# Patient Record
Sex: FEMALE | Race: WHITE | NOT HISPANIC OR LATINO
[De-identification: names, ages, dates, MRNs, and addresses within clinical notes are randomized per-mention and may not be internally consistent; named-entity substitution may affect disease eponyms.]

---

## 2020-11-07 ENCOUNTER — TRANSCRIPTION ENCOUNTER (OUTPATIENT)
Age: 75
End: 2020-11-07

## 2021-08-25 ENCOUNTER — OUTPATIENT (OUTPATIENT)
Dept: OUTPATIENT SERVICES | Facility: HOSPITAL | Age: 76
LOS: 1 days | Discharge: HOME | End: 2021-08-25
Payer: MEDICARE

## 2021-08-25 VITALS
SYSTOLIC BLOOD PRESSURE: 125 MMHG | HEART RATE: 88 BPM | OXYGEN SATURATION: 95 % | TEMPERATURE: 99 F | RESPIRATION RATE: 16 BRPM | HEIGHT: 64 IN | WEIGHT: 155.21 LBS | DIASTOLIC BLOOD PRESSURE: 61 MMHG

## 2021-08-25 DIAGNOSIS — Z90.89 ACQUIRED ABSENCE OF OTHER ORGANS: Chronic | ICD-10-CM

## 2021-08-25 DIAGNOSIS — Z90.710 ACQUIRED ABSENCE OF BOTH CERVIX AND UTERUS: Chronic | ICD-10-CM

## 2021-08-25 DIAGNOSIS — Z01.818 ENCOUNTER FOR OTHER PREPROCEDURAL EXAMINATION: ICD-10-CM

## 2021-08-25 DIAGNOSIS — Z98.890 OTHER SPECIFIED POSTPROCEDURAL STATES: Chronic | ICD-10-CM

## 2021-08-25 DIAGNOSIS — Z90.49 ACQUIRED ABSENCE OF OTHER SPECIFIED PARTS OF DIGESTIVE TRACT: Chronic | ICD-10-CM

## 2021-08-25 LAB
ANION GAP SERPL CALC-SCNC: 7 MMOL/L — SIGNIFICANT CHANGE UP (ref 5–17)
APTT BLD: 30.4 SEC — SIGNIFICANT CHANGE UP (ref 27.5–35.5)
BLD GP AB SCN SERPL QL: SIGNIFICANT CHANGE UP
BUN SERPL-MCNC: 16 MG/DL — SIGNIFICANT CHANGE UP (ref 7–23)
CALCIUM SERPL-MCNC: 9.1 MG/DL — SIGNIFICANT CHANGE UP (ref 8.5–10.1)
CHLORIDE SERPL-SCNC: 102 MMOL/L — SIGNIFICANT CHANGE UP (ref 96–108)
CO2 SERPL-SCNC: 30 MMOL/L — SIGNIFICANT CHANGE UP (ref 22–31)
CREAT SERPL-MCNC: 0.62 MG/DL — SIGNIFICANT CHANGE UP (ref 0.5–1.3)
GLUCOSE SERPL-MCNC: 94 MG/DL — SIGNIFICANT CHANGE UP (ref 70–99)
HCT VFR BLD CALC: 40.6 % — SIGNIFICANT CHANGE UP (ref 34.5–45)
HGB BLD-MCNC: 13.5 G/DL — SIGNIFICANT CHANGE UP (ref 11.5–15.5)
INR BLD: 0.96 RATIO — SIGNIFICANT CHANGE UP (ref 0.88–1.16)
MCHC RBC-ENTMCNC: 30.8 PG — SIGNIFICANT CHANGE UP (ref 27–34)
MCHC RBC-ENTMCNC: 33.3 GM/DL — SIGNIFICANT CHANGE UP (ref 32–36)
MCV RBC AUTO: 92.7 FL — SIGNIFICANT CHANGE UP (ref 80–100)
NRBC # BLD: 0 /100 WBCS — SIGNIFICANT CHANGE UP (ref 0–0)
PLATELET # BLD AUTO: 217 K/UL — SIGNIFICANT CHANGE UP (ref 150–400)
POTASSIUM SERPL-MCNC: 4.2 MMOL/L — SIGNIFICANT CHANGE UP (ref 3.5–5.3)
POTASSIUM SERPL-SCNC: 4.2 MMOL/L — SIGNIFICANT CHANGE UP (ref 3.5–5.3)
PROTHROM AB SERPL-ACNC: 11.2 SEC — SIGNIFICANT CHANGE UP (ref 10.6–13.6)
RBC # BLD: 4.38 M/UL — SIGNIFICANT CHANGE UP (ref 3.8–5.2)
RBC # FLD: 12.2 % — SIGNIFICANT CHANGE UP (ref 10.3–14.5)
SODIUM SERPL-SCNC: 139 MMOL/L — SIGNIFICANT CHANGE UP (ref 135–145)
WBC # BLD: 6.36 K/UL — SIGNIFICANT CHANGE UP (ref 3.8–10.5)
WBC # FLD AUTO: 6.36 K/UL — SIGNIFICANT CHANGE UP (ref 3.8–10.5)

## 2021-08-25 PROCEDURE — 93010 ELECTROCARDIOGRAM REPORT: CPT

## 2021-08-25 RX ORDER — LOSARTAN POTASSIUM 100 MG/1
1 TABLET, FILM COATED ORAL
Qty: 0 | Refills: 0 | DISCHARGE

## 2021-08-25 RX ORDER — MAGNESIUM OXIDE 400 MG ORAL TABLET 241.3 MG
1 TABLET ORAL
Qty: 0 | Refills: 0 | DISCHARGE

## 2021-08-25 RX ORDER — MIRABEGRON 50 MG/1
1 TABLET, EXTENDED RELEASE ORAL
Qty: 0 | Refills: 0 | DISCHARGE

## 2021-08-25 RX ORDER — OMEPRAZOLE 10 MG/1
1 CAPSULE, DELAYED RELEASE ORAL
Qty: 0 | Refills: 0 | DISCHARGE

## 2021-08-25 RX ORDER — GABAPENTIN 400 MG/1
1 CAPSULE ORAL
Qty: 0 | Refills: 0 | DISCHARGE

## 2021-08-25 RX ORDER — METOPROLOL TARTRATE 50 MG
1 TABLET ORAL
Qty: 0 | Refills: 0 | DISCHARGE

## 2021-08-25 RX ORDER — METHENAMINE MANDELATE 1 G
0 TABLET ORAL
Qty: 0 | Refills: 0 | DISCHARGE

## 2021-08-25 RX ORDER — MULTIVIT-MIN/FERROUS GLUCONATE 9 MG/15 ML
1 LIQUID (ML) ORAL
Qty: 0 | Refills: 0 | DISCHARGE

## 2021-08-25 RX ORDER — MILK THISTLE 150 MG
1 CAPSULE ORAL
Qty: 0 | Refills: 0 | DISCHARGE

## 2021-08-25 RX ORDER — ALPRAZOLAM 0.25 MG
0.25 TABLET ORAL
Qty: 0 | Refills: 0 | DISCHARGE

## 2021-08-25 RX ORDER — ATORVASTATIN CALCIUM 80 MG/1
1 TABLET, FILM COATED ORAL
Qty: 0 | Refills: 0 | DISCHARGE

## 2021-08-25 RX ORDER — PSEUDOEPHED/ACETAMINOP/DPHA/CP 30-500-25
2 TABLET ORAL
Qty: 0 | Refills: 0 | DISCHARGE

## 2021-08-25 RX ORDER — ZINC SULFATE TAB 220 MG (50 MG ZINC EQUIVALENT) 220 (50 ZN) MG
25 TAB ORAL
Qty: 0 | Refills: 0 | DISCHARGE

## 2021-08-25 NOTE — PHYSICAL THERAPY INITIAL EVALUATION ADULT - RANGE OF MOTION EXAMINATION, REHAB EVAL
except right hip limited due to pain./bilateral upper extremity ROM was WNL (within normal limits)/bilateral lower extremity was ROM was WNL (within normal limits)/deficits as listed below

## 2021-08-25 NOTE — H&P PST ADULT - HISTORY OF PRESENT ILLNESS
76 year old female with a past medical history of HTN, GERD, HLD, chronic UTI's, pain that radiates down right leg, and limited ROM to right hip secondary to osteoarthritis  She is scheduled for a right total hip arthroplasty on 9/13/2021.    She denies fever, cough, SOB, recent travels, and sick contacts.     Goal: To walk without pain

## 2021-08-25 NOTE — H&P PST ADULT - NSICDXPASTSURGICALHX_GEN_ALL_CORE_FT
PAST SURGICAL HISTORY:  H/O arthroscopy of shoulder     H/O inguinal hernia repair     H/O: hysterectomy 200      History of appendectomy teenager    History of bladder surgery     History of tonsillectomy

## 2021-08-25 NOTE — PHYSICAL THERAPY INITIAL EVALUATION ADULT - PERTINENT HX OF CURRENT PROBLEM, REHAB EVAL
Patient attends pre-op testing today following consult c Dr. Moura due to chronic pain to right hip. Elective R EMMA is now scheduled in this facility for 9/13/2021.

## 2021-08-25 NOTE — OCCUPATIONAL THERAPY INITIAL EVALUATION ADULT - ADDITIONAL COMMENTS
Pt lives alone (Daughter can assist post op) in a private house with 4 steps with bilateral handrails (Close together) to enter. Once inside, the pt main bedroom and bathroom is on that floor when entering. The pts bathroom has a walk in shower stall, comfort height toilet seat and grab bars +. The pt reports that a 3/1 commode can fit over the toilet at home. The pt ambulates with no device and owns a straight cane. The pt reports that she has a bed cane to assist with getting into and out of the bed. The pt daily pain is a 0/10 at rest and a 10/10 with movement. The pt manages the pain with rest and Motrin, gabapentin and celebrex. The pt recently had outpatient PT, no recent falls and no buckling of the knees reported. The pt wears glasses all the time, R handed, currently drives and has no hearing impairments.

## 2021-08-25 NOTE — H&P PST ADULT - ASSESSMENT
76 year old female with a past medical history of HTN, GERD, HLD, chronic UTIs pain and limited ROM to right hip secondary to osteoarthritis  She is scheduled for a right total hip arthroplasty on 2021.    CAPRINI SCORE [CLOT]    AGE RELATED RISK FACTORS                                                       MOBILITY RELATED FACTORS  [ ] Age 41-60 years                                            (1 Point)                  [ ] Bed rest                                                        (1 Point)  [x ] Age: 61-74 years                                           (2 Points)                 [ ] Plaster cast                                                   (2 Points)  [ ] Age= 75 years                                              (3 Points)                 [ ] Bed bound for more than 72 hours                 (2 Points)    DISEASE RELATED RISK FACTORS                                               GENDER SPECIFIC FACTORS  [ ] Edema in the lower extremities                       (1 Point)                  [ ] Pregnancy                                                     (1 Point)  [ ] Varicose veins                                               (1 Point)                  [ ] Post-partum < 6 weeks                                   (1 Point)             [x ] BMI > 25 Kg/m2                                            (1 Point)                  [ ] Hormonal therapy  or oral contraception          (1 Point)                 [ ] Sepsis (in the previous month)                        (1 Point)                  [ ] History of pregnancy complications                 (1 point)  [ ] Pneumonia or serious lung disease                                               [ ] Unexplained or recurrent                     (1 Point)           (in the previous month)                               (1 Point)  [ ] Abnormal pulmonary function test                     (1 Point)                 SURGERY RELATED RISK FACTORS  [ ] Acute myocardial infarction                              (1 Point)                 [ ]  Section                                             (1 Point)  [ ] Congestive heart failure (in the previous month)  (1 Point)               [ ] Minor surgery                                                  (1 Point)   [ ] Inflammatory bowel disease                             (1 Point)                 [ ] Arthroscopic surgery                                        (2 Points)  [ ] Central venous access                                      (2 Points)                [ ] General surgery lasting more than 45 minutes   (2 Points)       [ ] Stroke (in the previous month)                          (5 Points)               [ x] Elective arthroplasty                                         (5 Points)                                                                                                                                               HEMATOLOGY RELATED FACTORS                                                 TRAUMA RELATED RISK FACTORS  [ ] Prior episodes of VTE                                     (3 Points)                [ ] Fracture of the hip, pelvis, or leg                       (5 Points)  [ ] Positive family history for VTE                         (3 Points)                 [ ] Acute spinal cord injury (in the previous month)  (5 Points)  [ ] Prothrombin 08304 A                                     (3 Points)                 [ ] Paralysis  (less than 1 month)                             (5 Points)  [ ] Factor V Leiden                                             (3 Points)                  [ ] Multiple Trauma within 1 month                        (5 Points)  [ ] Lupus anticoagulants                                     (3 Points)                                                           [ ] Anticardiolipin antibodies                               (3 Points)                                                       [ ] High homocysteine in the blood                      (3 Points)                                             [ ] Other congenital or acquired thrombophilia      (3 Points)                                                [ ] Heparin induced thrombocytopenia                  (3 Points)                                          Total Score [      8    ]    Caprini Score 0 - 2:  Low Risk, No VTE Prophylaxis required for most patients, encourage ambulation  Caprini Score 3 - 6:  At Risk, pharmacologic VTE prophylaxis is indicated for most patients (in the absence of a contraindication)  Caprini Score Greater than or = 7:  High Risk, pharmacologic VTE prophylaxis is indicated for most patients (in the absence of a contraindication)    Caprini score indicates that the patient is high risk for VTE event ( score 6 or greater). Surgical patient's in this group will benefit from both pharmacologic prophylaxis and intermittent compression devices . Surgical team will determine the balance between VTE  risk and bleeding risk and other clinical considerations

## 2021-08-25 NOTE — PHYSICAL THERAPY INITIAL EVALUATION ADULT - ADDITIONAL COMMENTS
Pt lives with her daughter (whom can provide assist upon D/C home) in a private home, 4 entry steps (B/L rails, close enough to reach both at the same time), all amenities on the 1st floor. Pt has a walk-in shower stall with a retractable shower head, comfort toilet seat height, & + grab bar. Pt states she is currently independent with all functional mobility including community ambulation without device. Pt owns straight cane (in good working condition & easily accessible). Pt states she is independent with ADL's as well. Pt also noted having bed cane(rail) that she use at home. Pt reports daily 0/10 pain at rest & states it is worse with any activity 10/10. Pt is right hand dominant, wears eye glasses, drives, & works part time remotely. Pt stated taking motrin prn for pain management. Goal of therapy: manage pain & improve functional mobility.

## 2021-08-25 NOTE — OCCUPATIONAL THERAPY INITIAL EVALUATION ADULT - PERTINENT HX OF CURRENT PROBLEM, REHAB EVAL
R hip OA which impacts pts ability to perform functional tasks/transfers and mobility. Pt is scheduled for R THR on 9/13/21.

## 2021-08-25 NOTE — H&P PST ADULT - NS PRO FEM  PAP SMEARS 3YRS
Pharmacy notified.   America Gold RN on 2/11/2021 at 8:40 AM         Somatropin (NORDITROPIN FLEXPRO) 5 MG/1.5ML SOPN 4.5 mL 5 2/2/2021  --   Sig - Route: Inject 0.5 mg Subcutaneous daily - Subcutaneous   Sent to pharmacy as: Norditropin FlexPro 5 MG/1.5ML Subcutaneous Solution Pen-injector (Somatropin)   Class: E-Prescribe   Order: [id number]   E-Prescribing Status: Receipt confirmed by pharmacy (2/2/2021  2:12 PM CST)   Printout Tracking    External Result Report   Pharmacy    [facility] - [place], [place] - [address]        Medication: Norditropin-Approved  Insurance Company: [employer] ([facility]) - Phone [phone] Fax [phone]  Pharmacy Filling the Rx: [facility] - [place] [address]  Filling Pharmacy Phone: [phone]  Filling Pharmacy Fax: [phone]  Start Date: 2/5/2021          
yes

## 2021-08-25 NOTE — H&P PST ADULT - NSICDXPASTMEDICALHX_GEN_ALL_CORE_FT
PAST MEDICAL HISTORY:  Chronic UTI     GERD (gastroesophageal reflux disease)     HLD (hyperlipidemia)     HTN (hypertension)     OAB (overactive bladder)

## 2021-08-25 NOTE — PHYSICAL THERAPY INITIAL EVALUATION ADULT - MODIFIED CLINICAL TEST OF SENSORY INTEGRATION IN BALANCE TEST
30 second Sit to Stand Test: (reps: 11, adaptation: uses armrest) ; One Leg Stand Test (OLST): Right: 2 secs,  Left: 2 secs

## 2021-08-26 LAB
A1C WITH ESTIMATED AVERAGE GLUCOSE RESULT: 6 % — HIGH (ref 4–5.6)
ESTIMATED AVERAGE GLUCOSE: 126 MG/DL — HIGH (ref 68–114)
MRSA PCR RESULT.: SIGNIFICANT CHANGE UP
S AUREUS DNA NOSE QL NAA+PROBE: SIGNIFICANT CHANGE UP

## 2021-08-27 PROBLEM — K21.9 GASTRO-ESOPHAGEAL REFLUX DISEASE WITHOUT ESOPHAGITIS: Chronic | Status: ACTIVE | Noted: 2021-08-25

## 2021-08-27 PROBLEM — I10 ESSENTIAL (PRIMARY) HYPERTENSION: Chronic | Status: ACTIVE | Noted: 2021-08-25

## 2021-08-27 PROBLEM — E78.5 HYPERLIPIDEMIA, UNSPECIFIED: Chronic | Status: ACTIVE | Noted: 2021-08-25

## 2021-08-27 PROBLEM — N39.0 URINARY TRACT INFECTION, SITE NOT SPECIFIED: Chronic | Status: ACTIVE | Noted: 2021-08-25

## 2021-08-27 PROBLEM — N32.81 OVERACTIVE BLADDER: Chronic | Status: ACTIVE | Noted: 2021-08-25

## 2021-09-03 DIAGNOSIS — Z01.818 ENCOUNTER FOR OTHER PREPROCEDURAL EXAMINATION: ICD-10-CM

## 2021-09-03 PROBLEM — Z00.00 ENCOUNTER FOR PREVENTIVE HEALTH EXAMINATION: Status: ACTIVE | Noted: 2021-09-03

## 2021-09-10 ENCOUNTER — APPOINTMENT (OUTPATIENT)
Dept: DISASTER EMERGENCY | Facility: CLINIC | Age: 76
End: 2021-09-10

## 2021-09-11 LAB — SARS-COV-2 N GENE NPH QL NAA+PROBE: NOT DETECTED

## 2021-09-12 ENCOUNTER — TRANSCRIPTION ENCOUNTER (OUTPATIENT)
Age: 76
End: 2021-09-12

## 2021-09-13 ENCOUNTER — TRANSCRIPTION ENCOUNTER (OUTPATIENT)
Age: 76
End: 2021-09-13

## 2021-09-13 ENCOUNTER — RESULT REVIEW (OUTPATIENT)
Age: 76
End: 2021-09-13

## 2021-09-13 ENCOUNTER — INPATIENT (INPATIENT)
Facility: HOSPITAL | Age: 76
LOS: 0 days | Discharge: HOME HEALTH | End: 2021-09-14
Attending: ORTHOPAEDIC SURGERY | Admitting: ORTHOPAEDIC SURGERY
Payer: MEDICARE

## 2021-09-13 ENCOUNTER — OUTPATIENT (OUTPATIENT)
Dept: OUTPATIENT SERVICES | Facility: HOSPITAL | Age: 76
LOS: 1 days | Discharge: HOME HEALTH | End: 2021-09-13
Payer: MEDICARE

## 2021-09-13 VITALS
WEIGHT: 153 LBS | RESPIRATION RATE: 17 BRPM | OXYGEN SATURATION: 98 % | DIASTOLIC BLOOD PRESSURE: 70 MMHG | HEIGHT: 64 IN | HEART RATE: 69 BPM | SYSTOLIC BLOOD PRESSURE: 110 MMHG | TEMPERATURE: 98 F

## 2021-09-13 DIAGNOSIS — Z90.710 ACQUIRED ABSENCE OF BOTH CERVIX AND UTERUS: Chronic | ICD-10-CM

## 2021-09-13 DIAGNOSIS — Z98.890 OTHER SPECIFIED POSTPROCEDURAL STATES: Chronic | ICD-10-CM

## 2021-09-13 DIAGNOSIS — Z90.89 ACQUIRED ABSENCE OF OTHER ORGANS: Chronic | ICD-10-CM

## 2021-09-13 DIAGNOSIS — Z90.49 ACQUIRED ABSENCE OF OTHER SPECIFIED PARTS OF DIGESTIVE TRACT: Chronic | ICD-10-CM

## 2021-09-13 LAB
ANION GAP SERPL CALC-SCNC: 2 MMOL/L — LOW (ref 5–17)
BLD GP AB SCN SERPL QL: SIGNIFICANT CHANGE UP
BUN SERPL-MCNC: 13 MG/DL — SIGNIFICANT CHANGE UP (ref 7–23)
CALCIUM SERPL-MCNC: 8.6 MG/DL — SIGNIFICANT CHANGE UP (ref 8.5–10.1)
CHLORIDE SERPL-SCNC: 105 MMOL/L — SIGNIFICANT CHANGE UP (ref 96–108)
CO2 SERPL-SCNC: 31 MMOL/L — SIGNIFICANT CHANGE UP (ref 22–31)
CREAT SERPL-MCNC: 0.6 MG/DL — SIGNIFICANT CHANGE UP (ref 0.5–1.3)
GLUCOSE BLDC GLUCOMTR-MCNC: 91 MG/DL — SIGNIFICANT CHANGE UP (ref 70–99)
GLUCOSE SERPL-MCNC: 113 MG/DL — HIGH (ref 70–99)
HCT VFR BLD CALC: 38.8 % — SIGNIFICANT CHANGE UP (ref 34.5–45)
HGB BLD-MCNC: 12.5 G/DL — SIGNIFICANT CHANGE UP (ref 11.5–15.5)
MCHC RBC-ENTMCNC: 30.3 PG — SIGNIFICANT CHANGE UP (ref 27–34)
MCHC RBC-ENTMCNC: 32.2 GM/DL — SIGNIFICANT CHANGE UP (ref 32–36)
MCV RBC AUTO: 94.2 FL — SIGNIFICANT CHANGE UP (ref 80–100)
NRBC # BLD: 0 /100 WBCS — SIGNIFICANT CHANGE UP (ref 0–0)
PLATELET # BLD AUTO: 193 K/UL — SIGNIFICANT CHANGE UP (ref 150–400)
POTASSIUM SERPL-MCNC: 4.2 MMOL/L — SIGNIFICANT CHANGE UP (ref 3.5–5.3)
POTASSIUM SERPL-SCNC: 4.2 MMOL/L — SIGNIFICANT CHANGE UP (ref 3.5–5.3)
RBC # BLD: 4.12 M/UL — SIGNIFICANT CHANGE UP (ref 3.8–5.2)
RBC # FLD: 12.3 % — SIGNIFICANT CHANGE UP (ref 10.3–14.5)
SODIUM SERPL-SCNC: 138 MMOL/L — SIGNIFICANT CHANGE UP (ref 135–145)
WBC # BLD: 7.69 K/UL — SIGNIFICANT CHANGE UP (ref 3.8–10.5)
WBC # FLD AUTO: 7.69 K/UL — SIGNIFICANT CHANGE UP (ref 3.8–10.5)

## 2021-09-13 PROCEDURE — 88311 DECALCIFY TISSUE: CPT | Mod: 26

## 2021-09-13 PROCEDURE — 88305 TISSUE EXAM BY PATHOLOGIST: CPT | Mod: 26

## 2021-09-13 RX ORDER — TRAMADOL HYDROCHLORIDE 50 MG/1
100 TABLET ORAL EVERY 4 HOURS
Refills: 0 | Status: DISCONTINUED | OUTPATIENT
Start: 2021-09-13 | End: 2021-09-14

## 2021-09-13 RX ORDER — INFLUENZA VIRUS VACCINE 15; 15; 15; 15 UG/.5ML; UG/.5ML; UG/.5ML; UG/.5ML
0.5 SUSPENSION INTRAMUSCULAR ONCE
Refills: 0 | Status: DISCONTINUED | OUTPATIENT
Start: 2021-09-13 | End: 2021-09-14

## 2021-09-13 RX ORDER — CELECOXIB 200 MG/1
200 CAPSULE ORAL ONCE
Refills: 0 | Status: COMPLETED | OUTPATIENT
Start: 2021-09-13 | End: 2021-09-13

## 2021-09-13 RX ORDER — LANOLIN ALCOHOL/MO/W.PET/CERES
3 CREAM (GRAM) TOPICAL AT BEDTIME
Refills: 0 | Status: DISCONTINUED | OUTPATIENT
Start: 2021-09-13 | End: 2021-09-14

## 2021-09-13 RX ORDER — MAGNESIUM HYDROXIDE 400 MG/1
30 TABLET, CHEWABLE ORAL DAILY
Refills: 0 | Status: DISCONTINUED | OUTPATIENT
Start: 2021-09-13 | End: 2021-09-14

## 2021-09-13 RX ORDER — ACETAMINOPHEN 500 MG
975 TABLET ORAL EVERY 8 HOURS
Refills: 0 | Status: DISCONTINUED | OUTPATIENT
Start: 2021-09-13 | End: 2021-09-14

## 2021-09-13 RX ORDER — HYDROMORPHONE HYDROCHLORIDE 2 MG/ML
0.5 INJECTION INTRAMUSCULAR; INTRAVENOUS; SUBCUTANEOUS ONCE
Refills: 0 | Status: DISCONTINUED | OUTPATIENT
Start: 2021-09-13 | End: 2021-09-14

## 2021-09-13 RX ORDER — KETOROLAC TROMETHAMINE 30 MG/ML
15 SYRINGE (ML) INJECTION ONCE
Refills: 0 | Status: DISCONTINUED | OUTPATIENT
Start: 2021-09-13 | End: 2021-09-14

## 2021-09-13 RX ORDER — ACETAMINOPHEN 500 MG
650 TABLET ORAL ONCE
Refills: 0 | Status: COMPLETED | OUTPATIENT
Start: 2021-09-13 | End: 2021-09-13

## 2021-09-13 RX ORDER — BENZOCAINE AND MENTHOL 5; 1 G/100ML; G/100ML
1 LIQUID ORAL EVERY 4 HOURS
Refills: 0 | Status: DISCONTINUED | OUTPATIENT
Start: 2021-09-13 | End: 2021-09-14

## 2021-09-13 RX ORDER — ATORVASTATIN CALCIUM 80 MG/1
10 TABLET, FILM COATED ORAL AT BEDTIME
Refills: 0 | Status: DISCONTINUED | OUTPATIENT
Start: 2021-09-13 | End: 2021-09-13

## 2021-09-13 RX ORDER — OXYCODONE HYDROCHLORIDE 5 MG/1
10 TABLET ORAL
Refills: 0 | Status: DISCONTINUED | OUTPATIENT
Start: 2021-09-13 | End: 2021-09-13

## 2021-09-13 RX ORDER — SENNA PLUS 8.6 MG/1
2 TABLET ORAL AT BEDTIME
Refills: 0 | Status: DISCONTINUED | OUTPATIENT
Start: 2021-09-13 | End: 2021-09-14

## 2021-09-13 RX ORDER — ASCORBIC ACID 60 MG
500 TABLET,CHEWABLE ORAL
Refills: 0 | Status: DISCONTINUED | OUTPATIENT
Start: 2021-09-13 | End: 2021-09-14

## 2021-09-13 RX ORDER — SODIUM CHLORIDE 9 MG/ML
1000 INJECTION, SOLUTION INTRAVENOUS
Refills: 0 | Status: DISCONTINUED | OUTPATIENT
Start: 2021-09-13 | End: 2021-09-13

## 2021-09-13 RX ORDER — SODIUM CHLORIDE 9 MG/ML
3 INJECTION INTRAMUSCULAR; INTRAVENOUS; SUBCUTANEOUS EVERY 8 HOURS
Refills: 0 | Status: DISCONTINUED | OUTPATIENT
Start: 2021-09-13 | End: 2021-09-13

## 2021-09-13 RX ORDER — ATORVASTATIN CALCIUM 80 MG/1
10 TABLET, FILM COATED ORAL DAILY
Refills: 0 | Status: DISCONTINUED | OUTPATIENT
Start: 2021-09-13 | End: 2021-09-14

## 2021-09-13 RX ORDER — LOSARTAN POTASSIUM 100 MG/1
50 TABLET, FILM COATED ORAL DAILY
Refills: 0 | Status: DISCONTINUED | OUTPATIENT
Start: 2021-09-13 | End: 2021-09-14

## 2021-09-13 RX ORDER — DEXAMETHASONE 0.5 MG/5ML
10 ELIXIR ORAL ONCE
Refills: 0 | Status: COMPLETED | OUTPATIENT
Start: 2021-09-14 | End: 2021-09-14

## 2021-09-13 RX ORDER — TRAMADOL HYDROCHLORIDE 50 MG/1
50 TABLET ORAL ONCE
Refills: 0 | Status: DISCONTINUED | OUTPATIENT
Start: 2021-09-13 | End: 2021-09-13

## 2021-09-13 RX ORDER — TRAMADOL HYDROCHLORIDE 50 MG/1
50 TABLET ORAL EVERY 4 HOURS
Refills: 0 | Status: DISCONTINUED | OUTPATIENT
Start: 2021-09-13 | End: 2021-09-14

## 2021-09-13 RX ORDER — ALPRAZOLAM 0.25 MG
0.25 TABLET ORAL
Refills: 0 | Status: DISCONTINUED | OUTPATIENT
Start: 2021-09-13 | End: 2021-09-14

## 2021-09-13 RX ORDER — CEFAZOLIN SODIUM 1 G
2000 VIAL (EA) INJECTION EVERY 8 HOURS
Refills: 0 | Status: COMPLETED | OUTPATIENT
Start: 2021-09-13 | End: 2021-09-13

## 2021-09-13 RX ORDER — SODIUM CHLORIDE 9 MG/ML
1000 INJECTION, SOLUTION INTRAVENOUS
Refills: 0 | Status: DISCONTINUED | OUTPATIENT
Start: 2021-09-13 | End: 2021-09-14

## 2021-09-13 RX ORDER — GABAPENTIN 400 MG/1
600 CAPSULE ORAL
Refills: 0 | Status: DISCONTINUED | OUTPATIENT
Start: 2021-09-13 | End: 2021-09-14

## 2021-09-13 RX ORDER — ACETAMINOPHEN 500 MG
1000 TABLET ORAL ONCE
Refills: 0 | Status: COMPLETED | OUTPATIENT
Start: 2021-09-13 | End: 2021-09-13

## 2021-09-13 RX ORDER — HYDROMORPHONE HYDROCHLORIDE 2 MG/ML
0.4 INJECTION INTRAMUSCULAR; INTRAVENOUS; SUBCUTANEOUS
Refills: 0 | Status: DISCONTINUED | OUTPATIENT
Start: 2021-09-13 | End: 2021-09-13

## 2021-09-13 RX ORDER — OXYCODONE HYDROCHLORIDE 5 MG/1
5 TABLET ORAL
Refills: 0 | Status: DISCONTINUED | OUTPATIENT
Start: 2021-09-13 | End: 2021-09-13

## 2021-09-13 RX ORDER — METOPROLOL TARTRATE 50 MG
50 TABLET ORAL DAILY
Refills: 0 | Status: DISCONTINUED | OUTPATIENT
Start: 2021-09-13 | End: 2021-09-14

## 2021-09-13 RX ORDER — ACETAMINOPHEN 500 MG
1000 TABLET ORAL ONCE
Refills: 0 | Status: DISCONTINUED | OUTPATIENT
Start: 2021-09-13 | End: 2021-09-14

## 2021-09-13 RX ORDER — ONDANSETRON 8 MG/1
4 TABLET, FILM COATED ORAL EVERY 6 HOURS
Refills: 0 | Status: DISCONTINUED | OUTPATIENT
Start: 2021-09-13 | End: 2021-09-14

## 2021-09-13 RX ORDER — ONDANSETRON 8 MG/1
4 TABLET, FILM COATED ORAL ONCE
Refills: 0 | Status: DISCONTINUED | OUTPATIENT
Start: 2021-09-13 | End: 2021-09-13

## 2021-09-13 RX ORDER — POLYETHYLENE GLYCOL 3350 17 G/17G
17 POWDER, FOR SOLUTION ORAL AT BEDTIME
Refills: 0 | Status: DISCONTINUED | OUTPATIENT
Start: 2021-09-13 | End: 2021-09-14

## 2021-09-13 RX ORDER — ASPIRIN/CALCIUM CARB/MAGNESIUM 324 MG
81 TABLET ORAL
Refills: 0 | Status: DISCONTINUED | OUTPATIENT
Start: 2021-09-14 | End: 2021-09-14

## 2021-09-13 RX ORDER — CELECOXIB 200 MG/1
200 CAPSULE ORAL EVERY 12 HOURS
Refills: 0 | Status: DISCONTINUED | OUTPATIENT
Start: 2021-09-14 | End: 2021-09-14

## 2021-09-13 RX ORDER — TRAMADOL HYDROCHLORIDE 50 MG/1
50 TABLET ORAL ONCE
Refills: 0 | Status: DISCONTINUED | OUTPATIENT
Start: 2021-09-13 | End: 2021-09-14

## 2021-09-13 RX ORDER — PANTOPRAZOLE SODIUM 20 MG/1
40 TABLET, DELAYED RELEASE ORAL
Refills: 0 | Status: DISCONTINUED | OUTPATIENT
Start: 2021-09-13 | End: 2021-09-14

## 2021-09-13 RX ADMIN — Medication 100 MILLIGRAM(S): at 23:24

## 2021-09-13 RX ADMIN — GABAPENTIN 600 MILLIGRAM(S): 400 CAPSULE ORAL at 18:36

## 2021-09-13 RX ADMIN — OXYCODONE HYDROCHLORIDE 10 MILLIGRAM(S): 5 TABLET ORAL at 15:54

## 2021-09-13 RX ADMIN — POLYETHYLENE GLYCOL 3350 17 GRAM(S): 17 POWDER, FOR SOLUTION ORAL at 21:10

## 2021-09-13 RX ADMIN — OXYCODONE HYDROCHLORIDE 10 MILLIGRAM(S): 5 TABLET ORAL at 12:50

## 2021-09-13 RX ADMIN — SENNA PLUS 2 TABLET(S): 8.6 TABLET ORAL at 21:09

## 2021-09-13 RX ADMIN — Medication 1000 MILLIGRAM(S): at 15:30

## 2021-09-13 RX ADMIN — TRAMADOL HYDROCHLORIDE 50 MILLIGRAM(S): 50 TABLET ORAL at 19:30

## 2021-09-13 RX ADMIN — GABAPENTIN 600 MILLIGRAM(S): 400 CAPSULE ORAL at 23:24

## 2021-09-13 RX ADMIN — SODIUM CHLORIDE 3 MILLILITER(S): 9 INJECTION INTRAMUSCULAR; INTRAVENOUS; SUBCUTANEOUS at 06:43

## 2021-09-13 RX ADMIN — TRAMADOL HYDROCHLORIDE 100 MILLIGRAM(S): 50 TABLET ORAL at 21:10

## 2021-09-13 RX ADMIN — CELECOXIB 200 MILLIGRAM(S): 200 CAPSULE ORAL at 06:50

## 2021-09-13 RX ADMIN — Medication 650 MILLIGRAM(S): at 06:50

## 2021-09-13 RX ADMIN — Medication 100 MILLIGRAM(S): at 15:54

## 2021-09-13 RX ADMIN — GABAPENTIN 600 MILLIGRAM(S): 400 CAPSULE ORAL at 13:48

## 2021-09-13 RX ADMIN — SODIUM CHLORIDE 125 MILLILITER(S): 9 INJECTION, SOLUTION INTRAVENOUS at 11:56

## 2021-09-13 RX ADMIN — Medication 3 MILLIGRAM(S): at 21:09

## 2021-09-13 RX ADMIN — TRAMADOL HYDROCHLORIDE 100 MILLIGRAM(S): 50 TABLET ORAL at 22:10

## 2021-09-13 RX ADMIN — Medication 500 MILLIGRAM(S): at 18:36

## 2021-09-13 RX ADMIN — Medication 400 MILLIGRAM(S): at 14:59

## 2021-09-13 RX ADMIN — OXYCODONE HYDROCHLORIDE 10 MILLIGRAM(S): 5 TABLET ORAL at 16:54

## 2021-09-13 RX ADMIN — OXYCODONE HYDROCHLORIDE 10 MILLIGRAM(S): 5 TABLET ORAL at 11:56

## 2021-09-13 RX ADMIN — Medication 975 MILLIGRAM(S): at 23:24

## 2021-09-13 RX ADMIN — TRAMADOL HYDROCHLORIDE 50 MILLIGRAM(S): 50 TABLET ORAL at 18:37

## 2021-09-13 RX ADMIN — Medication 0.25 MILLIGRAM(S): at 23:24

## 2021-09-13 RX ADMIN — Medication 1 TABLET(S): at 11:56

## 2021-09-13 NOTE — PHYSICAL THERAPY INITIAL EVALUATION ADULT - GENERAL OBSERVATIONS, REHAB EVAL
Pt found semi supine in bed in NAD, +hep lock, +SCDs, +abduction pillow, agreeable to PT Lian and RN Mendez aware.

## 2021-09-13 NOTE — DISCHARGE NOTE PROVIDER - NSDCFUADDINST_GEN_ALL_CORE_FT
Hip replacement precautions:  Elevate the leg (while keeping hip precautions) as often as possible to help control swelling. Incentive spirometer 10X/hr.     As per Dr. Moura:  Patient does not need abduction pillow at home.  Patient can sleep with regular pillow between their knees.  Patient can sleep up on non operative side.    Keep URSZULA Dressing Clean, Dry and Intact. May shower with URSZULA Dressing. Please do not scrub, soak, peel or pick at the URSZULA dressing. No creams, lotions, or oils over dressing. May shower and let water run over dressing, no baths. Pat dry once out of shower. Dressing to be removed in 7 days. Discard dressing and battery in garbage. If dressing is saturated from border to border - may remove and replace with clean dry dressing.    Shower instructions for URSZULA Dressing: Place battery pack in a water sealed bag (such as a Ziplock bag) and keep battery out of the water.   Alternately you can turn battery pack off (press orange button.) Twist tubing OFF battery pack before entering shower. Once done with showering. Pat dressing dry. Reconnect tubing and twist ON battery pack after you are dry. Then turn battery pack on (press orange button.)

## 2021-09-13 NOTE — DISCHARGE NOTE PROVIDER - NSDCFUADDAPPT_GEN_ALL_CORE_FT
Follow up with your surgeon in two weeks. Call for appointment.  If you need more pain medication, call your surgeon's office. For medication refills or authorizations, please call [phone]  We recommend that you call and schedule a follow up appointment within 2-4 weeks with your primary care physician for repeat blood work (CBC and BMP) for post hospital discharge follow-up care.  Call your surgeon if you have increased redness/pain/drainage or fever. Return to ER for shortness of breath/calf tenderness.

## 2021-09-13 NOTE — DISCHARGE NOTE PROVIDER - CARE PROVIDER_API CALL
Osmar Moura)  Orthopaedic Surgery  [address] [place], [place] [address]  Phone: [phone]  Fax: [phone]  Follow Up Time:

## 2021-09-13 NOTE — DISCHARGE NOTE PROVIDER - NSDCMRMEDTOKEN_GEN_ALL_CORE_FT
Calcium 600+D: 1000  orally  CeleBREX 200 mg oral capsule: 1 cap(s) orally once a day  Centrum Adults oral tablet: 1 tab(s) orally once a day  gabapentin 600 mg oral tablet: 1  orally 4 times a day  Hiprex 1 g oral tablet: orally once a day  Lipitor 10 mg oral tablet: 1 tab(s) orally once a day  losartan 50 mg oral tablet: 1 tab(s) orally once a day  Mag-Ox 400 oral tablet: 1 tab(s) orally once a day  metoprolol succinate 50 mg oral tablet, extended release: 1 tab(s) orally once a day  Motrin 400 mg oral tablet: 1 tab(s) orally every 6 hours  Myrbetriq 25 mg oral tablet, extended release: 1 tab(s) orally once a day  Myrbetriq 50 mg oral tablet, extended release: 1 tab(s) orally once a day  omeprazole 40 mg oral delayed release capsule: 1 cap(s) orally once a day  Turmeric 500 mg oral capsule: 1 cap(s) orally once a day  Tylenol Allergy Complete NightTime oral tablet: 2  orally once a day (at bedtime)  Xanax: 0.25 milligram(s) orally 2 times a day, As Needed  Zinc: 25  orally once a day   ascorbic acid 500 mg oral tablet: 1 tab(s) orally 2 times a day  aspirin 81 mg oral delayed release tablet: 1 tab(s) orally 2 times a day MDD:2 Tabs for DVT Prophylaxis   Calcium 600+D: 1000  orally  Centrum Adults oral tablet: 1 tab(s) orally once a day  gabapentin 600 mg oral tablet: 1  orally 4 times a day  Hiprex 1 g oral tablet: orally once a day  Lipitor 10 mg oral tablet: 1 tab(s) orally once a day  losartan 50 mg oral tablet: 1 tab(s) orally once a day  Mag-Ox 400 oral tablet: 1 tab(s) orally once a day  metoprolol succinate 50 mg oral tablet, extended release: 1 tab(s) orally once a day  Myrbetriq 25 mg oral tablet, extended release: 1 tab(s) orally once a day  Myrbetriq 50 mg oral tablet, extended release: 1 tab(s) orally once a day  omeprazole 40 mg oral delayed release capsule: 1 cap(s) orally once a day  senna oral tablet: 2 tab(s) orally once a day (at bedtime)  traMADol 50 mg oral tablet: 1 or 2  tab(s) orally every 4 hours, As needed MDD:12 Tabs  Turmeric 500 mg oral capsule: 1 cap(s) orally once a day  Tylenol Allergy Complete NightTime oral tablet: 2  orally once a day (at bedtime)  Xanax: 0.25 milligram(s) orally 2 times a day, As Needed  Zinc: 25  orally once a day

## 2021-09-13 NOTE — PHYSICAL THERAPY INITIAL EVALUATION ADULT - PERTINENT HX OF CURRENT PROBLEM, REHAB EVAL
Patient with complain of chronic pain in the R hip joint, diagnosed with OA, s/p R EMMA posterior approach.

## 2021-09-13 NOTE — CONSULT NOTE ADULT - SUBJECTIVE AND OBJECTIVE BOX
LESLIE STEPHENS is a 76y Female s/p RIGHT TOTAL HIP ARTHROPLASTY      w/ h/o HTN (hypertension)    HLD (hyperlipidemia)    OAB (overactive bladder)    Chronic UTI    GERD (gastroesophageal reflux disease)      denies any chest pain shortness of breath palpitation dizziness lightheadedness nausea vomiting fever or chills    History of appendectomy    History of tonsillectomy    H/O inguinal hernia repair    H/O: hysterectomy    H/O arthroscopy of shoulder    History of bladder surgery        SH: doesnot smoke or drink at this time    Macrobid (Nausea)    acetaminophen   Tablet .. 975 milliGRAM(s) Oral every 8 hours PRN  acetaminophen  IVPB .. 1000 milliGRAM(s) IV Intermittent once  ALPRAZolam 0.25 milliGRAM(s) Oral two times a day PRN  aluminum hydroxide/magnesium hydroxide/simethicone Suspension 30 milliLiter(s) Oral every 4 hours PRN  ascorbic acid 500 milliGRAM(s) Oral two times a day  atorvastatin 10 milliGRAM(s) Oral daily  benzocaine 15 mG/menthol 3.6 mG (Sugar-Free) Lozenge 1 Lozenge Oral every 4 hours PRN  ceFAZolin   IVPB 2000 milliGRAM(s) IV Intermittent every 8 hours  gabapentin 600 milliGRAM(s) Oral four times a day  HYDROmorphone  Injectable 0.5 milliGRAM(s) IV Push once  influenza   Vaccine 0.5 milliLiter(s) IntraMuscular once  ketorolac   Injectable 15 milliGRAM(s) IV Push once PRN  lactated ringers. 1000 milliLiter(s) IV Continuous <Continuous>  losartan 50 milliGRAM(s) Oral daily  magnesium hydroxide Suspension 30 milliLiter(s) Oral daily PRN  melatonin 3 milliGRAM(s) Oral at bedtime PRN  metoprolol succinate ER 50 milliGRAM(s) Oral daily  multivitamin 1 Tablet(s) Oral daily  ondansetron Injectable 4 milliGRAM(s) IV Push every 6 hours PRN  pantoprazole    Tablet 40 milliGRAM(s) Oral before breakfast  polyethylene glycol 3350 17 Gram(s) Oral at bedtime  senna 2 Tablet(s) Oral at bedtime  traMADol 50 milliGRAM(s) Oral every 4 hours PRN  traMADol 100 milliGRAM(s) Oral every 4 hours PRN  traMADol 50 milliGRAM(s) Oral once    T(C): 36.9 (09-13-21 @ 18:50), Max: 37.1 (09-13-21 @ 15:06)  HR: 81 (09-13-21 @ 21:04) (69 - 87)  BP: 150/71 (09-13-21 @ 21:04) (109/73 - 159/79)  RR: 16 (09-13-21 @ 21:04) (12 - 17)  SpO2: 98% (09-13-21 @ 21:04) (96% - 100%)  HEENT unremarkable  neck no JVD or bruit  heart normal S1 S2 RRR no gallops or rubs  chest clear to auscultation  abd sof nontender non distended +bs  ext no calf tenderness    A/P   DVT PX  pain control  bowel regimen   wound care as per ortho  GI PX  antiemetics prn  incentive spirometer

## 2021-09-13 NOTE — DISCHARGE NOTE PROVIDER - HOSPITAL COURSE
76yFemale with history of Right hip OA presenting for R EMMA by Dr. Moura on 9/13/21. Risk and benefits of surgery were explained to the patient. The patient understood and agreed to proceed with surgery. Patient underwent the procedure with no intraoperative complications. Pt was brought in stable condition to the PACU. Once stable in PACU, pt was brought to the floor. During hospital stay pt was followed by Medicine, physical therapy, Home Care during this admission. Pt had an uneventful hospital course. Pt is stable for discharge to home 76yFemale with history of Right hip OA presenting for R EMMA by Dr. Moura on 9/13/21. Risk and benefits of surgery were explained to the patient. The patient understood and agreed to proceed with surgery. Patient underwent the procedure with no intraoperative complications. Pt was brought in stable condition to the PACU. Once stable in PACU, pt was brought to the floor. During hospital stay pt was followed by Medicine, physical therapy, Home Care during this admission. Pt had an uneventful hospital course. Pt is stable for discharge to home with Home Care Services and PT

## 2021-09-13 NOTE — PHYSICAL THERAPY INITIAL EVALUATION ADULT - TRANSFER TRAINING, PT EVAL
Independent in transfer ability bed to chair and vice versa using appropriate assistive device  and prevent falls.

## 2021-09-13 NOTE — PHYSICAL THERAPY INITIAL EVALUATION ADULT - ADDITIONAL COMMENTS
Post op information confirmed. Pt lives with her daughter (whom can provide assist upon D/C home) in a private home, 4 entry steps (B/L rails, far apart), all amenities on the 1st floor. Pt has a walk-in shower stall with a retractable shower head, comfort toilet seat height, & + grab bar. Pt states she is currently independent with all functional mobility including community ambulation without device. Pt owns straight cane (in good working condition & easily accessible). Pt states she is independent with ADL's as well. Pt also noted having bed cane(rail) that she use at home. Pt reports daily 0/10 pain at rest & states it is worse with any activity 10/10. Pt is right hand dominant, wears eye glasses, drives, & works part time remotely. Pt stated taking motrin prn for pain management. Goal of therapy: manage pain & improve functional mobility.

## 2021-09-13 NOTE — OCCUPATIONAL THERAPY INITIAL EVALUATION ADULT - ADDITIONAL COMMENTS
Pre op assessment confirmed -Pt lives alone (Daughter can assist post op) in a private house with 4 steps with bilateral handrails (Close together) to enter. Once inside, the pt main bedroom and bathroom is on that floor when entering. The pts bathroom has a walk in shower stall, comfort height toilet seat and grab bars +. The pt reports that a 3/1 commode can fit over the toilet at home.

## 2021-09-14 ENCOUNTER — TRANSCRIPTION ENCOUNTER (OUTPATIENT)
Age: 76
End: 2021-09-14

## 2021-09-14 VITALS — SYSTOLIC BLOOD PRESSURE: 132 MMHG | DIASTOLIC BLOOD PRESSURE: 76 MMHG | HEART RATE: 75 BPM | OXYGEN SATURATION: 96 %

## 2021-09-14 LAB
ANION GAP SERPL CALC-SCNC: 4 MMOL/L — LOW (ref 5–17)
BUN SERPL-MCNC: 16 MG/DL — SIGNIFICANT CHANGE UP (ref 7–23)
CALCIUM SERPL-MCNC: 8.6 MG/DL — SIGNIFICANT CHANGE UP (ref 8.5–10.1)
CHLORIDE SERPL-SCNC: 101 MMOL/L — SIGNIFICANT CHANGE UP (ref 96–108)
CO2 SERPL-SCNC: 30 MMOL/L — SIGNIFICANT CHANGE UP (ref 22–31)
COVID-19 SPIKE DOMAIN AB INTERP: POSITIVE
COVID-19 SPIKE DOMAIN ANTIBODY RESULT: 217 U/ML — HIGH
CREAT SERPL-MCNC: 0.52 MG/DL — SIGNIFICANT CHANGE UP (ref 0.5–1.3)
GLUCOSE SERPL-MCNC: 105 MG/DL — HIGH (ref 70–99)
HCT VFR BLD CALC: 33.2 % — LOW (ref 34.5–45)
HGB BLD-MCNC: 10.6 G/DL — LOW (ref 11.5–15.5)
MCHC RBC-ENTMCNC: 30.1 PG — SIGNIFICANT CHANGE UP (ref 27–34)
MCHC RBC-ENTMCNC: 31.9 GM/DL — LOW (ref 32–36)
MCV RBC AUTO: 94.3 FL — SIGNIFICANT CHANGE UP (ref 80–100)
NRBC # BLD: 0 /100 WBCS — SIGNIFICANT CHANGE UP (ref 0–0)
PLATELET # BLD AUTO: 179 K/UL — SIGNIFICANT CHANGE UP (ref 150–400)
POTASSIUM SERPL-MCNC: 4.3 MMOL/L — SIGNIFICANT CHANGE UP (ref 3.5–5.3)
POTASSIUM SERPL-SCNC: 4.3 MMOL/L — SIGNIFICANT CHANGE UP (ref 3.5–5.3)
RBC # BLD: 3.52 M/UL — LOW (ref 3.8–5.2)
RBC # FLD: 12.4 % — SIGNIFICANT CHANGE UP (ref 10.3–14.5)
SARS-COV-2 IGG+IGM SERPL QL IA: 217 U/ML — HIGH
SARS-COV-2 IGG+IGM SERPL QL IA: POSITIVE
SODIUM SERPL-SCNC: 135 MMOL/L — SIGNIFICANT CHANGE UP (ref 135–145)
WBC # BLD: 7.22 K/UL — SIGNIFICANT CHANGE UP (ref 3.8–10.5)
WBC # FLD AUTO: 7.22 K/UL — SIGNIFICANT CHANGE UP (ref 3.8–10.5)

## 2021-09-14 RX ORDER — TRAMADOL HYDROCHLORIDE 50 MG/1
1 TABLET ORAL
Qty: 42 | Refills: 0
Start: 2021-09-14 | End: 2021-09-20

## 2021-09-14 RX ORDER — CELECOXIB 200 MG/1
1 CAPSULE ORAL
Qty: 0 | Refills: 0 | DISCHARGE

## 2021-09-14 RX ORDER — IBUPROFEN 200 MG
1 TABLET ORAL
Qty: 0 | Refills: 0 | DISCHARGE

## 2021-09-14 RX ORDER — ASCORBIC ACID 60 MG
1 TABLET,CHEWABLE ORAL
Qty: 0 | Refills: 0 | DISCHARGE
Start: 2021-09-14

## 2021-09-14 RX ORDER — ASPIRIN/CALCIUM CARB/MAGNESIUM 324 MG
1 TABLET ORAL
Qty: 60 | Refills: 0
Start: 2021-09-14 | End: 2021-10-13

## 2021-09-14 RX ORDER — SENNA PLUS 8.6 MG/1
2 TABLET ORAL
Qty: 0 | Refills: 0 | DISCHARGE
Start: 2021-09-14

## 2021-09-14 RX ADMIN — TRAMADOL HYDROCHLORIDE 50 MILLIGRAM(S): 50 TABLET ORAL at 11:34

## 2021-09-14 RX ADMIN — TRAMADOL HYDROCHLORIDE 100 MILLIGRAM(S): 50 TABLET ORAL at 01:27

## 2021-09-14 RX ADMIN — Medication 81 MILLIGRAM(S): at 06:29

## 2021-09-14 RX ADMIN — Medication 1 TABLET(S): at 12:59

## 2021-09-14 RX ADMIN — ATORVASTATIN CALCIUM 10 MILLIGRAM(S): 80 TABLET, FILM COATED ORAL at 06:29

## 2021-09-14 RX ADMIN — TRAMADOL HYDROCHLORIDE 100 MILLIGRAM(S): 50 TABLET ORAL at 08:04

## 2021-09-14 RX ADMIN — LOSARTAN POTASSIUM 50 MILLIGRAM(S): 100 TABLET, FILM COATED ORAL at 06:29

## 2021-09-14 RX ADMIN — PANTOPRAZOLE SODIUM 40 MILLIGRAM(S): 20 TABLET, DELAYED RELEASE ORAL at 06:34

## 2021-09-14 RX ADMIN — Medication 975 MILLIGRAM(S): at 00:28

## 2021-09-14 RX ADMIN — Medication 50 MILLIGRAM(S): at 06:29

## 2021-09-14 RX ADMIN — GABAPENTIN 600 MILLIGRAM(S): 400 CAPSULE ORAL at 12:59

## 2021-09-14 RX ADMIN — Medication 500 MILLIGRAM(S): at 06:29

## 2021-09-14 RX ADMIN — CELECOXIB 200 MILLIGRAM(S): 200 CAPSULE ORAL at 07:20

## 2021-09-14 RX ADMIN — CELECOXIB 200 MILLIGRAM(S): 200 CAPSULE ORAL at 06:29

## 2021-09-14 RX ADMIN — TRAMADOL HYDROCHLORIDE 50 MILLIGRAM(S): 50 TABLET ORAL at 10:37

## 2021-09-14 RX ADMIN — TRAMADOL HYDROCHLORIDE 100 MILLIGRAM(S): 50 TABLET ORAL at 02:27

## 2021-09-14 RX ADMIN — TRAMADOL HYDROCHLORIDE 100 MILLIGRAM(S): 50 TABLET ORAL at 07:04

## 2021-09-14 RX ADMIN — Medication 102 MILLIGRAM(S): at 06:34

## 2021-09-14 RX ADMIN — GABAPENTIN 600 MILLIGRAM(S): 400 CAPSULE ORAL at 06:29

## 2021-09-14 NOTE — DISCHARGE NOTE NURSING/CASE MANAGEMENT/SOCIAL WORK - NSDCPEFALRISK_GEN_ALL_CORE
For information on Fall & injury Prevention, visit [email or web address]
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
## 2021-09-14 NOTE — DISCHARGE NOTE NURSING/CASE MANAGEMENT/SOCIAL WORK - PATIENT PORTAL LINK FT
You can access the FollowMyHealth Patient Portal offered by [facility] by registering at the following website: [email or web address]. By joining [facility]’s FollowMyHealth portal, you will also be able to view your health information using other applications (apps) compatible with our system.

## 2021-09-14 NOTE — DISCHARGE NOTE NURSING/CASE MANAGEMENT/SOCIAL WORK - NSDCFUADDAPPT_GEN_ALL_CORE_FT
Follow up with your surgeon in two weeks. Call for appointment.  If you need more pain medication, call your surgeon's office. For medication refills or authorizations, please call [phone]  We recommend that you call and schedule a follow up appointment within 2-4 weeks with your primary care physician for repeat blood work (CBC and BMP) for post hospital discharge follow-up care.  Call your surgeon if you have increased redness/pain/drainage or fever. Return to ER for shortness of breath/calf tenderness.

## 2021-09-15 LAB — SURGICAL PATHOLOGY STUDY: SIGNIFICANT CHANGE UP

## 2021-09-15 PROCEDURE — 73501 X-RAY EXAM HIP UNI 1 VIEW: CPT | Mod: 26,RT

## 2021-09-16 DIAGNOSIS — K21.9 GASTRO-ESOPHAGEAL REFLUX DISEASE WITHOUT ESOPHAGITIS: ICD-10-CM

## 2021-09-16 DIAGNOSIS — M16.11 UNILATERAL PRIMARY OSTEOARTHRITIS, RIGHT HIP: ICD-10-CM

## 2021-09-16 DIAGNOSIS — Z88.1 ALLERGY STATUS TO OTHER ANTIBIOTIC AGENTS STATUS: ICD-10-CM

## 2021-09-16 DIAGNOSIS — Z87.891 PERSONAL HISTORY OF NICOTINE DEPENDENCE: ICD-10-CM

## 2021-09-16 DIAGNOSIS — N32.81 OVERACTIVE BLADDER: ICD-10-CM

## 2021-09-16 DIAGNOSIS — E78.5 HYPERLIPIDEMIA, UNSPECIFIED: ICD-10-CM

## 2021-09-16 DIAGNOSIS — I10 ESSENTIAL (PRIMARY) HYPERTENSION: ICD-10-CM

## 2021-09-16 DIAGNOSIS — Z79.1 LONG TERM (CURRENT) USE OF NON-STEROIDAL ANTI-INFLAMMATORIES (NSAID): ICD-10-CM

## 2021-09-17 ENCOUNTER — TRANSCRIPTION ENCOUNTER (OUTPATIENT)
Age: 76
End: 2021-09-17

## 2021-09-21 DIAGNOSIS — Z79.1 LONG TERM (CURRENT) USE OF NON-STEROIDAL ANTI-INFLAMMATORIES (NSAID): ICD-10-CM

## 2021-09-21 DIAGNOSIS — N32.81 OVERACTIVE BLADDER: ICD-10-CM

## 2021-09-21 DIAGNOSIS — E78.5 HYPERLIPIDEMIA, UNSPECIFIED: ICD-10-CM

## 2021-09-21 DIAGNOSIS — Z87.891 PERSONAL HISTORY OF NICOTINE DEPENDENCE: ICD-10-CM

## 2021-09-21 DIAGNOSIS — Z88.1 ALLERGY STATUS TO OTHER ANTIBIOTIC AGENTS STATUS: ICD-10-CM

## 2021-09-21 DIAGNOSIS — I10 ESSENTIAL (PRIMARY) HYPERTENSION: ICD-10-CM

## 2021-09-21 DIAGNOSIS — K21.9 GASTRO-ESOPHAGEAL REFLUX DISEASE WITHOUT ESOPHAGITIS: ICD-10-CM

## 2021-09-21 DIAGNOSIS — M16.11 UNILATERAL PRIMARY OSTEOARTHRITIS, RIGHT HIP: ICD-10-CM

## 2021-09-22 DIAGNOSIS — Z90.710 ACQUIRED ABSENCE OF BOTH CERVIX AND UTERUS: ICD-10-CM

## 2021-09-22 DIAGNOSIS — M16.11 UNILATERAL PRIMARY OSTEOARTHRITIS, RIGHT HIP: ICD-10-CM

## 2021-09-27 NOTE — OCCUPATIONAL THERAPY INITIAL EVALUATION ADULT - SITTING BALANCE: STATIC
normal balance
Alert-The patient is alert, awake and responds to voice. The patient is oriented to time, place, and person. The triage nurse is able to obtain subjective information.

## 2022-04-07 NOTE — H&P PST ADULT - DOCUMENT STATUS
Next Appointment:    Last seen:    Last refill:  2/22/22  #60 tabs 5 refills (6 month worth)  Not refilled   Authored by Resident/PA/NP

## 2022-04-21 ENCOUNTER — APPOINTMENT (OUTPATIENT)
Dept: ORTHOPEDIC SURGERY | Facility: CLINIC | Age: 77
End: 2022-04-21
Payer: MEDICARE

## 2022-04-21 VITALS — BODY MASS INDEX: 25.95 KG/M2 | HEIGHT: 64 IN | WEIGHT: 152 LBS

## 2022-04-21 PROCEDURE — J3490M: CUSTOM

## 2022-04-21 PROCEDURE — 99214 OFFICE O/P EST MOD 30 MIN: CPT | Mod: 25

## 2022-04-21 PROCEDURE — 20610 DRAIN/INJ JOINT/BURSA W/O US: CPT | Mod: 50

## 2022-04-21 NOTE — IMAGING
[de-identified] : Constitutional: The patient appears well developed, well nourished. Examination of patients ability to communicate\par functionally was normal. \par Skin: Skin of the head and face is normal without rashes, lesions or ulcers. \par Cardiovascular: Peripheral Vascular System is normal. \par Neurologic: Alert and oriented to time, place and person. No evidence of mood disorder, calm affect. \par Right Hip/Thigh: Inspection of the hip/thigh is as follows: Inspection shows no swelling and no\par ecchymosis. Inspection of the wound reveals intact skin and no sign of infection. Range of motion of the hip is as\par follows: Patient displays good endpoint with internal rotation at 15 degrees Strength testing of the hip/thigh is as\par follows: Hip flexion strength is 5/5, Hip extension strength is 5/5, Hip abductionstrength is 5/5 and Hip\par adductionstrength is 5/5. Neurological testing of the hip/thigh is as follows: decreased sensation laterally. Gait\par and function is as follows: patient ambulates without assistive device. \par X-Ray Examination of the HIP with Pelvis 2-3 views X-Ray Examination of the HIP 2-3 views Right shows there\par is no loss of surgical correction. Bony alignment is acceptable. All hardware is in appropriate position and\par components well-fixed, in good position. \par Right Knee: Inspection of the knee is as follows: no effusion, erythema, ecchymosis, scars or deformities. Palpation\par of the knee is as follows: medial joint line tenderness, medial femoral condyle tenderness, medial tibial\par plateau tenderness and patellar compression tenderness. Strength examination of the knee is as\par follows: Quadriceps strength is 4-/5 Ligament Stability and Special Test ligamentously stable.
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
## 2022-04-21 NOTE — ASSESSMENT
[FreeTextEntry1] :  77 year F WITH MODERATE BILAT KNEE PAIN. NO PRIOR HISTORY/TREATMENT. PAIN WORSENS WITH STAIRS AND WALKING PROLONGED DISTANCES. PAIN IS AFFECTING ADL AND FUNCTIONAL ACTIVITIES. XRAYS REVIEWED. TREATMENT OPTIONS REVIEWED. BILAT KNEE CSI TODAY. LAST VISCO WAS LT KNEE JAN 6,2022. WILL REQUEST AUTH FOR EUFLEXXA RT KNEE. \par \par RT EMMA DISLOCATION ON 10.05.21 AFTER GETTING OUT OF BED. RT HIP DOING WELL.\par \par \par

## 2022-04-21 NOTE — PROCEDURE
[de-identified] : Procedure Name: Large Joint Injection / Aspiration: Depomedrol and Marcaine\par Anesthesia: ethyl chloride sprayed topically.. \par Depomedrol: An injection of Depomedrol 80 mg , 1 cc. \par Marcaine: 5 cc. \par Medication was injected in bilateral knees. Patient has tried OTC's including aspirin, Ibuprofen, Aleve etc or prescriptionNSAIDS, and/or exercises at home and/ or physical therapy without satisfactory response. After verbal consent using sterile preparation and technique. The risks, benefits, and alternatives to cortisone injection were explained in full to the patient. Risks outlined include but are not limited to infection, sepsis, bleeding, scarring, skin discoloration, temporary  increase in pain, syncopal episode, failure to resolve symptoms, allergic reaction, symptom recurrence, and elevation of blood sugar in diabetics. Patient understood the risks. All questions were answered. After discussion of options, patient requested an injection. Oral informed consent was obtained and sterile prep was done of the injection site. Sterile technique was utilized for the procedure including the preparation of the solutions used for the injection. Patient tolerated the procedure well. Advised to ice the injection site this evening. Prep with alcohol locally to site. Sterile technique used. Post Procedure Instructions: Patient was advised to call if redness, pain, or fever occur and apply ice for 15 min. out of every hour for the next 12-24 hours as tolerated.

## 2022-05-26 ENCOUNTER — APPOINTMENT (OUTPATIENT)
Dept: ORTHOPEDIC SURGERY | Facility: CLINIC | Age: 77
End: 2022-05-26
Payer: MEDICARE

## 2022-05-26 VITALS — WEIGHT: 152 LBS | HEIGHT: 64 IN | BODY MASS INDEX: 25.95 KG/M2

## 2022-05-26 PROCEDURE — 99214 OFFICE O/P EST MOD 30 MIN: CPT | Mod: 25

## 2022-05-26 NOTE — HISTORY OF PRESENT ILLNESS
[Left Leg] : left leg [Right Leg] : right leg [Gradual] : gradual [5] : 5 [0] : 0 [Dull/Aching] : dull/aching [Localized] : localized [Occasional] : occasional [Rest] : rest [Walking] : walking [1] : 1 [Synvisc] : Synvisc [de-identified] : 78 y/o here for right knee synvisc #1. also c/o left knee pain here with MRI showing OA. pain on and off. some relief with left knee coritsone last visit. visco not due. \par right hip s/p soledad with dislocation, doing well\par Occasional left groin pain with lifting leg. not disabling.  [] : This patient has had an injection before: no [FreeTextEntry1] : Left knee/right hip [FreeTextEntry5] : No accident has occurred. Pt feels like the left knee will give out on her. Discomfort in her right hip [de-identified] :  [de-identified] : RT knee

## 2022-05-26 NOTE — ASSESSMENT
[FreeTextEntry1] :  S/P RT EMMA DISLOCATION ON 10.05.21 AFTER GETTING OUT OF BED. RT HIP DOING WELL.\par \par BILATERAL KNEE OA. HERE WITH MRI LEFT KNEE SHOWING ADVANCED OA. OCCASIONAL PAIN. WILL DO GEL SHOTS IN JULY WHEN DUE. RIGHT KNEE START SYNVISC #1 TODAY\par RPT XRAYS BILATERAL KNEES NEXT VISIT\par \par LEFT HIP OCCASIONAL PAIN. LAST XRAYS NEGATIVE. WILL CONSIDER MRI IF PERSITS\par \par \par

## 2022-05-26 NOTE — IMAGING
[de-identified] : \par Right Hip/Thigh: Inspection of the hip/thigh is as follows: Inspection shows no swelling and no\par ecchymosis. Inspection of the wound reveals intact skin and no sign of infection. Range of motion of the hip is as\par follows: Patient displays good endpoint with internal rotation at 15 degrees Strength testing of the hip/thigh is as\par follows: Hip flexion strength is 5/5, Hip extension strength is 5/5, Hip abductionstrength is 5/5 and Hip\par adductionstrength is 5/5. Neurological testing of the hip/thigh is as follows: decreased sensation laterally. Gait\par and function is as follows: patient ambulates without assistive device. \par X-Ray Examination of the HIP with Pelvis 2-3 views X-Ray Examination of the HIP 2-3 views Right shows there\par is no loss of surgical correction. Bony alignment is acceptable. All hardware is in appropriate position and\par components well-fixed, in good position. \par \par Right Knee: Inspection of the knee is as follows: no effusion, erythema, ecchymosis, scars or deformities. Palpation\par of the knee is as follows: medial joint line tenderness, medial femoral condyle tenderness, medial tibial\par plateau tenderness and patellar compression tenderness. Strength examination of the knee is as\par follows: Quadriceps strength is 4-/5 Ligament Stability and Special Test ligamentously stable.\par \par left knee: crepitus upon knee cap with medial and lateral t line tenderness. O-130 with some flexion discomfort\par Left hip: No pain with rotation today

## 2022-05-26 NOTE — PROCEDURE
[Large Joint Injection] : Large joint injection [Right] : of the right [Knee] : knee [Pain] : pain [Inflammation] : inflammation [Repeat series performed] : repeat series performed [Alcohol] : alcohol [Synvisc] : Synvisc [#1] : series #1 [Effusion] : effusion

## 2022-06-02 ENCOUNTER — APPOINTMENT (OUTPATIENT)
Dept: ORTHOPEDIC SURGERY | Facility: CLINIC | Age: 77
End: 2022-06-02
Payer: MEDICARE

## 2022-06-02 DIAGNOSIS — M25.552 PAIN IN LEFT HIP: ICD-10-CM

## 2022-06-02 PROCEDURE — 99214 OFFICE O/P EST MOD 30 MIN: CPT | Mod: 25

## 2022-06-02 PROCEDURE — 20610 DRAIN/INJ JOINT/BURSA W/O US: CPT

## 2022-06-02 NOTE — HISTORY OF PRESENT ILLNESS
[Left Leg] : left leg [Right Leg] : right leg [Gradual] : gradual [5] : 5 [0] : 0 [Dull/Aching] : dull/aching [Localized] : localized [Occasional] : occasional [Rest] : rest [Walking] : walking [1] : 1 [Synvisc] : Synvisc [de-identified] : 78 y/o here for right knee synvisc #1. also c/o left knee pain here with MRI showing OA. pain on and off. some relief with left knee coritsone last visit. visco not due. \par right hip s/p soledad with dislocation, doing well\par Occasional left groin pain with lifting leg. not disabling.  [] : This patient has had an injection before: no [FreeTextEntry1] : Left knee/right hip [FreeTextEntry5] : No accident has occurred. Pt feels like the left knee will give out on her. Discomfort in her right hip [de-identified] :  [de-identified] : RT knee

## 2022-06-02 NOTE — IMAGING
[de-identified] : \par Right Hip/Thigh: Inspection of the hip/thigh is as follows: Inspection shows no swelling and no\par ecchymosis. Inspection of the wound reveals intact skin and no sign of infection. Range of motion of the hip is as\par follows: Patient displays good endpoint with internal rotation at 15 degrees Strength testing of the hip/thigh is as\par follows: Hip flexion strength is 5/5, Hip extension strength is 5/5, Hip abductionstrength is 5/5 and Hip\par adductionstrength is 5/5. Neurological testing of the hip/thigh is as follows: decreased sensation laterally. Gait\par and function is as follows: patient ambulates without assistive device. \par X-Ray Examination of the HIP with Pelvis 2-3 views X-Ray Examination of the HIP 2-3 views Right shows there\par is no loss of surgical correction. Bony alignment is acceptable. All hardware is in appropriate position and\par components well-fixed, in good position. \par \par Right Knee: Inspection of the knee is as follows: no effusion, erythema, ecchymosis, scars or deformities. Palpation\par of the knee is as follows: medial joint line tenderness, medial femoral condyle tenderness, medial tibial\par plateau tenderness and patellar compression tenderness. Strength examination of the knee is as\par follows: Quadriceps strength is 4-/5 Ligament Stability and Special Test ligamentously stable.\par \par left knee: crepitus upon knee cap with medial and lateral t line tenderness. O-130 with some flexion discomfort\par Left hip: No pain with rotation today

## 2022-06-02 NOTE — PROCEDURE
[Large Joint Injection] : Large joint injection [Right] : of the right [Knee] : knee [Pain] : pain [Inflammation] : inflammation [Repeat series performed] : repeat series performed [Alcohol] : alcohol [Synvisc] : Synvisc [Effusion] : effusion [#2] : series #2 [de-identified] : .

## 2022-06-02 NOTE — ASSESSMENT
[FreeTextEntry1] : RIGHT KNEE SYNVISC #2 TODAY\par \par LEFT HIP OCCASIONAL PAIN. LAST XRAYS NEGATIVE. WILL GET MRI LEFT HIP TO R/O AND EVAL OA LEFT HIP. \par \par \par

## 2022-06-09 ENCOUNTER — APPOINTMENT (OUTPATIENT)
Dept: ORTHOPEDIC SURGERY | Facility: CLINIC | Age: 77
End: 2022-06-09
Payer: MEDICARE

## 2022-06-09 PROCEDURE — 20610 DRAIN/INJ JOINT/BURSA W/O US: CPT | Mod: RT

## 2022-06-09 PROCEDURE — 99214 OFFICE O/P EST MOD 30 MIN: CPT | Mod: 25

## 2022-06-09 NOTE — ASSESSMENT
[FreeTextEntry1] : RIGHT KNEE SYNVISC #3 TODAY\par \par LEFT HIP PAIN. LAST XRAYS NEGATIVE. WILL GET MRI LEFT HIP TO R/O AND EVAL OA LEFT HIP. MRI OF LT HIP 6/3/22 REVIEWED. HIP INJECTION UNDER FLUORO IS INDICATED.

## 2022-06-09 NOTE — IMAGING
[de-identified] : \par Right Hip/Thigh: Inspection of the hip/thigh is as follows: Inspection shows no swelling and no\par ecchymosis. Inspection of the wound reveals intact skin and no sign of infection. Range of motion of the hip is as\par follows: Patient displays good endpoint with internal rotation at 15 degrees Strength testing of the hip/thigh is as\par follows: Hip flexion strength is 5/5, Hip extension strength is 5/5, Hip abductionstrength is 5/5 and Hip\par adductionstrength is 5/5. Neurological testing of the hip/thigh is as follows: decreased sensation laterally. Gait\par and function is as follows: patient ambulates without assistive device. \par X-Ray Examination of the HIP with Pelvis 2-3 views X-Ray Examination of the HIP 2-3 views Right shows there\par is no loss of surgical correction. Bony alignment is acceptable. All hardware is in appropriate position and\par components well-fixed, in good position. \par \par Right Knee: Inspection of the knee is as follows: no effusion, erythema, ecchymosis, scars or deformities. Palpation\par of the knee is as follows: medial joint line tenderness, medial femoral condyle tenderness, medial tibial\par plateau tenderness and patellar compression tenderness. Strength examination of the knee is as\par follows: Quadriceps strength is 4-/5 Ligament Stability and Special Test ligamentously stable.\par \par left knee: crepitus upon knee cap with medial and lateral t line tenderness. O-130 with some flexion discomfort\par Left hip: No pain with rotation today

## 2022-06-09 NOTE — DATA REVIEWED
[FreeTextEntry1] : MRI 6/3/22:\par \par 1. 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 Minimal high-grade chondral fissuring along the superior chondrolateral junction; the remainder of the articular cartilage is well preserved.\par 2. Mild to moderate common hamstrings tendinosis with intermediate grade partial tear of the conjoined biceps femoris-semitendinosis tendon.\par 3. chronic high-grade partial tear of the gluteus minimus and anterior gluteus medius tendons with enthesophyte formation about the greater trochanter.\par 4. Minimal trochanteric and subgluteus minimus bursitis.

## 2022-06-09 NOTE — HISTORY OF PRESENT ILLNESS
[Left Leg] : left leg [Right Leg] : right leg [Gradual] : gradual [5] : 5 [0] : 0 [Dull/Aching] : dull/aching [Localized] : localized [Occasional] : occasional [Rest] : rest [Walking] : walking [1] : 1 [Synvisc] : Synvisc [de-identified] : 78 y/o here for right knee synvisc #1. also c/o left knee pain here with MRI showing OA. pain on and off. some relief with left knee coritsone last visit. visco not due. \par right hip s/p soledad with dislocation, doing well\par Occasional left groin pain with lifting leg. not disabling.  [] : This patient has had an injection before: no [FreeTextEntry1] : Left knee/right hip [FreeTextEntry5] : No accident has occurred. Pt feels like the left knee will give out on her. Discomfort in her right hip [de-identified] :  [de-identified] : RT knee

## 2022-06-09 NOTE — PROCEDURE
[Large Joint Injection] : Large joint injection [Right] : of the right [Knee] : knee [Pain] : pain [Inflammation] : inflammation [Repeat series performed] : repeat series performed [Alcohol] : alcohol [Synvisc] : Synvisc [Effusion] : effusion [#3] : series #3

## 2022-06-20 ENCOUNTER — NON-APPOINTMENT (OUTPATIENT)
Age: 77
End: 2022-06-20

## 2022-06-23 ENCOUNTER — NON-APPOINTMENT (OUTPATIENT)
Age: 77
End: 2022-06-23

## 2022-06-27 ENCOUNTER — LABORATORY RESULT (OUTPATIENT)
Age: 77
End: 2022-06-27

## 2022-06-29 ENCOUNTER — LABORATORY RESULT (OUTPATIENT)
Age: 77
End: 2022-06-29

## 2022-07-01 ENCOUNTER — APPOINTMENT (OUTPATIENT)
Dept: PAIN MANAGEMENT | Facility: CLINIC | Age: 77
End: 2022-07-01
Payer: MEDICARE

## 2022-07-01 PROCEDURE — 73525 CONTRAST X-RAY OF HIP: CPT | Mod: 1L,LT

## 2022-07-01 PROCEDURE — 27093 INJECTION FOR HIP X-RAY: CPT | Mod: 1L,LT

## 2022-07-01 PROCEDURE — 77002 NEEDLE LOCALIZATION BY XRAY: CPT | Mod: 26,1L

## 2022-07-01 PROCEDURE — J3490M: CUSTOM | Mod: 1L

## 2022-07-01 NOTE — PROCEDURE
[FreeTextEntry3] : Date of Service: 07/01/2022 \par \par Account: [id number]\par \par Patient: LESLIE STEPHENS \par \par YOB: 1945\par \par Age: 77 year\par \par \par Surgeon:                                    Osmar Moura M.D.\par \par Pre-Operative Diagnosis:       Hip Osteoarthritis\par \par Post Operative Diagnosis:      Same\par \par Procedure:                              Left Hip arthrogram and steroid injection under fluoroscopic guidance\par \par After obtaining consent, the patient was placed on the fluoro table in the supine position.  The femoral neurovascular bundle was identified and marked.  The area was prepped with chloropred and the skin was anesthetized with 1% plain lidocaine.  An 20 gauge spinal needle was then inserted down on the anterior fem neck using fluoroscopic guidance.  Once in the proper position, contrast was injected and needle was confirmed to be intraarticular.  5 cc's of 0.5% Marcaine and 80mg of depomedrol were then injected into the hip joint.  A band-aid was applied and the patient tolerated the procedure well. \par \par Osmar Moura M.D.

## 2022-07-07 ENCOUNTER — APPOINTMENT (OUTPATIENT)
Dept: ORTHOPEDIC SURGERY | Facility: CLINIC | Age: 77
End: 2022-07-07

## 2022-07-07 PROCEDURE — 99213 OFFICE O/P EST LOW 20 MIN: CPT

## 2022-07-08 NOTE — ASSESSMENT
[FreeTextEntry1] : RIGHT KNEE INJECTION TODAY.\par \par LEFT HIP PAIN. LAST XRAYS NEGATIVE. WILL GET MRI LEFT HIP TO R/O AND EVAL OA LEFT HIP. MRI OF LT HIP 6/3/22 REVIEWED. GOOD RELEIF WITH HIP INJECTION. PAIN HAS RESOLVED. WILL RPT AS NEEDED.\par \par SPOKE TO PATIENT. WILL RTN FOR RIGHT KNEE CORTISONE NEXT WEEK.

## 2022-07-08 NOTE — HISTORY OF PRESENT ILLNESS
[1] : 1 [de-identified] : 7.7.22 [de-identified] : rt knee [de-identified] : none [TWNoteComboBox1] : 0%

## 2022-07-08 NOTE — IMAGING
[de-identified] : \par Right Hip/Thigh: Inspection of the hip/thigh is as follows: Inspection shows no swelling and no\par ecchymosis. Inspection of the wound reveals intact skin and no sign of infection. Range of motion of the hip is as\par follows: Patient displays good endpoint with internal rotation at 15 degrees Strength testing of the hip/thigh is as\par follows: Hip flexion strength is 5/5, Hip extension strength is 5/5, Hip abductionstrength is 5/5 and Hip\par adductionstrength is 5/5. Neurological testing of the hip/thigh is as follows: decreased sensation laterally. Gait\par and function is as follows: patient ambulates without assistive device. \par X-Ray Examination of the HIP with Pelvis 2-3 views X-Ray Examination of the HIP 2-3 views Right shows there\par is no loss of surgical correction. Bony alignment is acceptable. All hardware is in appropriate position and\par components well-fixed, in good position. \par \par Right Knee: Inspection of the knee is as follows: no effusion, erythema, ecchymosis, scars or deformities. Palpation\par of the knee is as follows: medial joint line tenderness, medial femoral condyle tenderness, medial tibial\par plateau tenderness and patellar compression tenderness. Strength examination of the knee is as\par follows: Quadriceps strength is 4-/5 Ligament Stability and Special Test ligamentously stable.\par \par left knee: crepitus upon knee cap with medial and lateral t line tenderness. O-130 with some flexion discomfort\par Left hip: No pain with rotation today

## 2022-07-14 ENCOUNTER — APPOINTMENT (OUTPATIENT)
Dept: ORTHOPEDIC SURGERY | Facility: CLINIC | Age: 77
End: 2022-07-14

## 2022-07-14 PROCEDURE — 20610 DRAIN/INJ JOINT/BURSA W/O US: CPT

## 2022-07-14 PROCEDURE — J3490M: CUSTOM

## 2022-07-14 PROCEDURE — 99214 OFFICE O/P EST MOD 30 MIN: CPT | Mod: 25

## 2022-07-14 NOTE — IMAGING
[de-identified] : \par Right Hip/Thigh: Inspection of the hip/thigh is as follows: Inspection shows no swelling and no\par ecchymosis. Inspection of the wound reveals intact skin and no sign of infection. Range of motion of the hip is as\par follows: Patient displays good endpoint with internal rotation at 15 degrees Strength testing of the hip/thigh is as\par follows: Hip flexion strength is 5/5, Hip extension strength is 5/5, Hip abductionstrength is 5/5 and Hip\par adductionstrength is 5/5. Neurological testing of the hip/thigh is as follows: decreased sensation laterally. Gait\par and function is as follows: patient ambulates without assistive device. \par X-Ray Examination of the HIP with Pelvis 2-3 views X-Ray Examination of the HIP 2-3 views Right shows there\par is no loss of surgical correction. Bony alignment is acceptable. All hardware is in appropriate position and\par components well-fixed, in good position. \par \par Right Knee: Inspection of the knee is as follows: no effusion, erythema, ecchymosis, scars or deformities. Palpation\par of the knee is as follows: medial joint line tenderness, medial femoral condyle tenderness, medial tibial\par plateau tenderness and patellar compression tenderness. Strength examination of the knee is as\par follows: Quadriceps strength is 4-/5 Ligament Stability and Special Test ligamentously stable.\par \par left knee: crepitus upon knee cap with medial and lateral t line tenderness. O-130 with some flexion discomfort\par Left hip: No pain with rotation today

## 2022-07-14 NOTE — ASSESSMENT
[FreeTextEntry1] : 77 year F WITH MODERATE B/L KNEE OA. PAIN WORSENS WITH STAIRS AND WALKING PROLONGED DISTANCES. PAIN IS AFFECTING ADL AND FUNCTIONAL ACTIVITIES. MRI REVIEWED. TREATMENT OPTIONS REVIEWED. RT KNEE CSI TODAY.

## 2022-07-14 NOTE — PROCEDURE
[de-identified] : Procedure Name: Large Joint Injection / Aspiration: Depomedrol and Marcaine\par Anesthesia: ethyl chloride sprayed topically.. \par Depomedrol: An injection of Depomedrol 80 mg , 1 cc. \par Marcaine: 5 cc. \par Medication was injected in the right knee. Patient has tried OTC's including aspirin, Ibuprofen, Aleve etc or prescription\par NSAIDS, and/or exercises at home and/ or physical therapy without satisfactory response. After verbal consent using sterile preparation and technique. The risks, benefits, and alternatives to cortisone injection were explained in full to the patient. Risks outlined include but are not limited to infection, sepsis, bleeding, scarring, skin discoloration, temporary  increase in pain, syncopal episode, failure to resolve symptoms, allergic reaction, symptom recurrence, and elevation of blood sugar in diabetics. Patient understood the risks. All questions were answered. After discussion of options, patient requested an injection. Oral informed consent was obtained and sterile prep was done of the injection\par site. Sterile technique was utilized for the procedure including the preparation of the solutions used for the injection. Patient tolerated the procedure well. Advised to ice the injection site this evening. Prep with alcohol locally to site. Sterile technique used. Post Procedure Instructions: Patient was advised to call if redness, pain, or fever occur and\par apply ice for 15 min. out of every hour for the next 12-24 hours as tolerated.

## 2022-07-14 NOTE — HISTORY OF PRESENT ILLNESS
[1] : 1 [de-identified] : 7.7.22 [de-identified] : rt knee [de-identified] : none [TWNoteComboBox1] : 0%

## 2022-07-28 ENCOUNTER — APPOINTMENT (OUTPATIENT)
Dept: ORTHOPEDIC SURGERY | Facility: CLINIC | Age: 77
End: 2022-07-28

## 2022-07-28 PROCEDURE — 99213 OFFICE O/P EST LOW 20 MIN: CPT

## 2022-07-28 RX ORDER — METHYLPREDNISOLONE 4 MG/1
4 TABLET ORAL
Qty: 1 | Refills: 0 | Status: ACTIVE | COMMUNITY
Start: 2022-07-28 | End: 1900-01-01

## 2022-07-28 NOTE — REASON FOR VISIT
[FreeTextEntry2] : LEFT LEG/HIP PAIN THAT STARTED LAST WEEK FOR 3 DAYS.  TOOK MDP FINISHED YEST WHICH HELPED. \par PAIN WAS WORSE WITH SITTING, BETTER WITH WALKING AND STANDING. OK LYING FLAT. PAIN INTO THIGH ONLY.

## 2022-07-28 NOTE — HISTORY OF PRESENT ILLNESS
[1] : 1 [de-identified] : 7.7.22 [de-identified] : rt knee [de-identified] : none [TWNoteComboBox1] : 0%

## 2022-07-28 NOTE — IMAGING
[de-identified] : Left Hip/Thigh: Inspection of the hip/thigh is as follows: Inspection shows no swelling and no\par ecchymosis. Inspection of the wound reveals intact skin and no sign of infection. Range of motion of the hip is as\par follows: Patient displays good endpoint with internal rotation at 15 degrees Strength testing of the hip/thigh is as\par follows: Hip flexion strength is 5/5, Hip extension strength is 5/5, Hip abduction strength is 5/5 and Hip\par adduction strength is 5/5. Neurological testing of the hip/thigh is as follows: decreased sensation laterally. Gait\par and function is as follows: patient ambulates without assistive device. \par \par Right Knee: Inspection of the knee is as follows: no effusion, erythema, ecchymosis, scars or deformities. Palpation\par of the knee is as follows: medial joint line tenderness, medial femoral condyle tenderness, medial tibial\par plateau tenderness and patellar compression tenderness. Strength examination of the knee is as\par follows: Quadriceps strength is 4-/5 Ligament Stability and Special Test ligamentously stable.\par \par left knee: crepitus upon knee cap with medial and lateral t line tenderness. 0-130 with some flexion discomfort\par Left hip: No pain with rotation today

## 2022-07-28 NOTE — ASSESSMENT
[FreeTextEntry1] : 77 year F WITH MODERATE LEFT LEG/HIP PAIN THAT STARTED MID JULY 2022. WAS PRESCRIBED MDP WHICH SHE FINISHED YESTERDAY. STATES THE MDP HELPED. PAIN WORSENED WITH SITTING, BETTER WITH WALKING AND STANDING. OK LYING FLAT. PAIN INTO THIGH ONLY. NO PAIN IN OFFICE.

## 2022-09-08 ENCOUNTER — APPOINTMENT (OUTPATIENT)
Dept: ORTHOPEDIC SURGERY | Facility: CLINIC | Age: 77
End: 2022-09-08
Payer: MEDICARE

## 2022-09-08 DIAGNOSIS — M54.16 RADICULOPATHY, LUMBAR REGION: ICD-10-CM

## 2022-09-08 DIAGNOSIS — M48.061 SPINAL STENOSIS, LUMBAR REGION WITHOUT NEUROGENIC CLAUDICATION: ICD-10-CM

## 2022-09-08 DIAGNOSIS — M16.12 UNILATERAL PRIMARY OSTEOARTHRITIS, LEFT HIP: ICD-10-CM

## 2022-09-08 PROCEDURE — L1833: CPT | Mod: 1L,RT

## 2022-09-08 PROCEDURE — 99214 OFFICE O/P EST MOD 30 MIN: CPT | Mod: 25

## 2022-09-08 PROCEDURE — J3490M: CUSTOM

## 2022-09-08 PROCEDURE — 20610 DRAIN/INJ JOINT/BURSA W/O US: CPT | Mod: LT

## 2022-09-08 NOTE — HISTORY OF PRESENT ILLNESS
[1] : 1 [Left Leg] : left leg [Right Leg] : right leg [7] : 7 [6] : 6 [Localized] : localized [Intermittent] : intermittent [Household chores] : household chores [Injection therapy] : injection therapy [Standing] : standing [Walking] : walking [Stairs] : stairs [] : no [FreeTextEntry1] : knees [de-identified] : right knee cortisone injection 07/14/2022 [de-identified] : 7.7.22 [de-identified] : rt knee [de-identified] : none [TWNoteComboBox1] : 0%

## 2022-09-08 NOTE — PROCEDURE
[de-identified] : Procedure Name: Large Joint Injection / Aspiration: Depomedrol and Marcaine\par Anesthesia: ethyl chloride sprayed topically.. \par Depomedrol: An injection of Depomedrol 80 mg , 1 cc. \par Marcaine: 5 cc. \par Medication was injected in the left knee. Patient has tried OTC's including aspirin, Ibuprofen, Aleve etc or prescription\par NSAIDS, and/or exercises at home and/ or physical therapy without satisfactory response. After verbal consent using sterile preparation and technique. The risks, benefits, and alternatives to cortisone injection were explained in full to the patient. Risks outlined include but are not limited to infection, sepsis, bleeding, scarring, skin discoloration, temporary  increase in pain, syncopal episode, failure to resolve symptoms, allergic reaction, symptom recurrence, and elevation of blood sugar in diabetics. Patient understood the risks. All questions were answered. After discussion of options, patient requested an injection. Oral informed consent was obtained and sterile prep was done of the injection\par site. Sterile technique was utilized for the procedure including the preparation of the solutions used for the injection. Patient tolerated the procedure well. Advised to ice the injection site this evening. Prep with alcohol locally to site. Sterile technique used. Post Procedure Instructions: Patient was advised to call if redness, pain, or fever occur and\par apply ice for 15 min. out of every hour for the next 12-24 hours as tolerated.

## 2022-09-08 NOTE — ASSESSMENT
[FreeTextEntry1] : 77 year F WITH MODERATE LEFT LEG/HIP PAIN THAT STARTED MID JULY 2022. WAS PRESCRIBED MDP WHICH HELPED. PAIN WORSENED WITH SITTING, BETTER WITH WALKING AND STANDING. OK LYING FLAT. PAIN INTO THIGH ONLY. HAS MORE PAIN SINCE LAST VISIT, HAS BEEN DOING MORE ACTIVITY LATELY. HAD RT KNEE CSI ON 7/14/22 WITH GOOD RELIEF. \par \par LT KNEE CSI TODAY. PATIENT TOLERATED INEJCTION WELL. \par RT KNEE HKB. WILL FOLLOW UP IN OCTOBER FOR RT KNEE CSI.

## 2022-09-08 NOTE — IMAGING
[de-identified] : Left Hip/Thigh: Inspection of the hip/thigh is as follows: Inspection shows no swelling and no\par ecchymosis. Inspection of the wound reveals intact skin and no sign of infection. Range of motion of the hip is as\par follows: Patient displays good endpoint with internal rotation at 15 degrees Strength testing of the hip/thigh is as\par follows: Hip flexion strength is 5/5, Hip extension strength is 5/5, Hip abduction strength is 5/5 and Hip\par adduction strength is 5/5. Neurological testing of the hip/thigh is as follows: decreased sensation laterally. Gait\par and function is as follows: patient ambulates without assistive device. \par \par Right Knee: Inspection of the knee is as follows: no effusion, erythema, ecchymosis, scars or deformities. Palpation\par of the knee is as follows: medial joint line tenderness, medial femoral condyle tenderness, medial tibial\par plateau tenderness and patellar compression tenderness. Strength examination of the knee is as\par follows: Quadriceps strength is 4-/5 Ligament Stability and Special Test ligamentously stable.\par \par left knee: crepitus upon knee cap with medial and lateral t line tenderness. 0-130 with some flexion discomfort\par Left hip: No pain with rotation today

## 2022-10-13 ENCOUNTER — APPOINTMENT (OUTPATIENT)
Dept: ORTHOPEDIC SURGERY | Facility: CLINIC | Age: 77
End: 2022-10-13
Payer: MEDICARE

## 2022-10-13 PROCEDURE — 20610 DRAIN/INJ JOINT/BURSA W/O US: CPT | Mod: 59,RT

## 2022-10-13 PROCEDURE — 99214 OFFICE O/P EST MOD 30 MIN: CPT | Mod: 25

## 2022-10-13 PROCEDURE — J3490M: CUSTOM

## 2022-10-13 PROCEDURE — 20611 DRAIN/INJ JOINT/BURSA W/US: CPT | Mod: LT

## 2022-10-13 PROCEDURE — 73564 X-RAY EXAM KNEE 4 OR MORE: CPT | Mod: RT

## 2022-10-13 NOTE — PROCEDURE
[Large Joint Injection] : Large joint injection [Left] : of the left [Knee] : knee [Synvisc] : Synvisc [#1] : series #1 [de-identified] : Procedure Name: Large Joint Injection / Aspiration: Depomedrol and Marcaine\par Anesthesia: ethyl chloride sprayed topically.. \par Depomedrol: An injection of Depomedrol 80 mg , 1 cc. \par Marcaine: 5 cc. \par Medication was injected in the right knee. Patient has tried OTC's including aspirin, Ibuprofen, Aleve etc or prescription\par NSAIDS, and/or exercises at home and/ or physical therapy without satisfactory response. After verbal consent using sterile preparation and technique. The risks, benefits, and alternatives to cortisone injection were explained in full to the patient. Risks outlined include but are not limited to infection, sepsis, bleeding, scarring, skin discoloration, temporary  increase in pain, syncopal episode, failure to resolve symptoms, allergic reaction, symptom recurrence, and elevation of blood sugar in diabetics. Patient understood the risks. All questions were answered. After discussion of options, patient requested an injection. Oral informed consent was obtained and sterile prep was done of the injection\par site. Sterile technique was utilized for the procedure including the preparation of the solutions used for the injection. Patient tolerated the procedure well. Advised to ice the injection site this evening. Prep with alcohol locally to site. Sterile technique used. Post Procedure Instructions: Patient was advised to call if redness, pain, or fever occur and\par apply ice for 15 min. out of every hour for the next 12-24 hours as tolerated.

## 2022-10-13 NOTE — HISTORY OF PRESENT ILLNESS
[Dull/Aching] : dull/aching [Localized] : localized [Sharp] : sharp [Intermittent] : intermittent [Leisure] : leisure [Rest] : rest [Stairs] : stairs [Left Leg] : left leg [Right Leg] : right leg [7] : 7 [6] : 6 [Household chores] : household chores [Injection therapy] : injection therapy [Standing] : standing [Walking] : walking [1] : 1 [] : Post Surgical Visit: no [FreeTextEntry1] : knees [de-identified] : right knee cortisone injection 07/14/2022 [de-identified] : 7.7.22 [de-identified] : rt knee [de-identified] : none [TWNoteComboBox1] : 0%

## 2022-10-13 NOTE — ASSESSMENT
[FreeTextEntry1] : 77 year F WITH MODERATE LEFT LEG/HIP PAIN THAT STARTED MID JULY 2022. WAS PRESCRIBED MDP WHICH HELPED. PAIN WORSENED WITH SITTING, BETTER WITH WALKING AND STANDING. OK LYING FLAT. PAIN INTO THIGH ONLY. HAS MORE PAIN SINCE LAST VISIT, HAS BEEN DOING MORE ACTIVITY LATELY. HAD RT KNEE CSI ON 7/14/22 WITH GOOD RELIEF. \par \par RT KNEE CSI TODAY. TIMING REVIEWED. MAY RTN FOR VISCO IN DEC\par LEFT KNEE SYNVISC #1 TODAY.

## 2022-10-13 NOTE — IMAGING
[de-identified] : Left Hip/Thigh: Inspection of the hip/thigh is as follows: Inspection shows no swelling and no\par ecchymosis. Inspection of the wound reveals intact skin and no sign of infection. Range of motion of the hip is as\par follows: Patient displays good endpoint with internal rotation at 15 degrees Strength testing of the hip/thigh is as\par follows: Hip flexion strength is 5/5, Hip extension strength is 5/5, Hip abduction strength is 5/5 and Hip\par adduction strength is 5/5. Neurological testing of the hip/thigh is as follows: decreased sensation laterally. Gait\par and function is as follows: patient ambulates without assistive device. \par \par Right Knee: Inspection of the knee is as follows: no effusion, erythema, ecchymosis, scars or deformities. Palpation\par of the knee is as follows: medial joint line tenderness, medial femoral condyle tenderness, medial tibial\par plateau tenderness and patellar compression tenderness. Strength examination of the knee is as\par follows: Quadriceps strength is 4-/5 Ligament Stability and Special Test ligamentously stable.\par \par left knee: crepitus upon knee cap with medial and lateral t line tenderness. 0-130 with some flexion discomfort\par Left hip: No pain with rotation today

## 2022-10-21 ENCOUNTER — APPOINTMENT (OUTPATIENT)
Dept: ORTHOPEDIC SURGERY | Facility: CLINIC | Age: 77
End: 2022-10-21
Payer: MEDICARE

## 2022-10-21 VITALS — WEIGHT: 152 LBS | BODY MASS INDEX: 25.95 KG/M2 | HEIGHT: 64 IN

## 2022-10-21 PROCEDURE — 20610 DRAIN/INJ JOINT/BURSA W/O US: CPT | Mod: 1L,LT

## 2022-10-21 NOTE — HISTORY OF PRESENT ILLNESS
[Synvisc] : Synvisc [Left Leg] : left leg [Right Leg] : right leg [7] : 7 [6] : 6 [Dull/Aching] : dull/aching [Localized] : localized [Sharp] : sharp [Intermittent] : intermittent [Household chores] : household chores [Leisure] : leisure [Rest] : rest [Injection therapy] : injection therapy [Standing] : standing [Walking] : walking [Stairs] : stairs [1] : 1 [] : Post Surgical Visit: no [FreeTextEntry1] : knees [de-identified] : right knee cortisone injection 07/14/2022 [de-identified] : 7.7.22 [de-identified] : rt knee [de-identified] : none [TWNoteComboBox1] : 0%

## 2022-10-21 NOTE — IMAGING
[de-identified] : Left Hip/Thigh: Inspection of the hip/thigh is as follows: Inspection shows no swelling and no\par ecchymosis. Inspection of the wound reveals intact skin and no sign of infection. Range of motion of the hip is as\par follows: Patient displays good endpoint with internal rotation at 15 degrees Strength testing of the hip/thigh is as\par follows: Hip flexion strength is 5/5, Hip extension strength is 5/5, Hip abduction strength is 5/5 and Hip\par adduction strength is 5/5. Neurological testing of the hip/thigh is as follows: decreased sensation laterally. Gait\par and function is as follows: patient ambulates without assistive device. \par \par Right Knee: Inspection of the knee is as follows: no effusion, erythema, ecchymosis, scars or deformities. Palpation\par of the knee is as follows: medial joint line tenderness, medial femoral condyle tenderness, medial tibial\par plateau tenderness and patellar compression tenderness. Strength examination of the knee is as\par follows: Quadriceps strength is 4-/5 Ligament Stability and Special Test ligamentously stable.\par \par left knee: crepitus upon knee cap with medial and lateral t line tenderness. 0-130 with some flexion discomfort\par Left hip: No pain with rotation today

## 2022-10-27 ENCOUNTER — APPOINTMENT (OUTPATIENT)
Dept: ORTHOPEDIC SURGERY | Facility: CLINIC | Age: 77
End: 2022-10-27

## 2022-10-27 PROCEDURE — 99213 OFFICE O/P EST LOW 20 MIN: CPT | Mod: 25

## 2022-10-27 PROCEDURE — 20610 DRAIN/INJ JOINT/BURSA W/O US: CPT | Mod: LT

## 2022-10-27 NOTE — PROCEDURE
[Large Joint Injection] : Large joint injection [Left] : of the left [Knee] : knee [Synvisc] : Synvisc [#3] : series #3

## 2022-10-27 NOTE — HISTORY OF PRESENT ILLNESS
[Left Leg] : left leg [Right Leg] : right leg [7] : 7 [6] : 6 [Dull/Aching] : dull/aching [Localized] : localized [Sharp] : sharp [Intermittent] : intermittent [Household chores] : household chores [Leisure] : leisure [Rest] : rest [Injection therapy] : injection therapy [Standing] : standing [Walking] : walking [Stairs] : stairs [1] : 1 [Synvisc] : Synvisc [] : Post Surgical Visit: no [FreeTextEntry1] : knees [de-identified] : right knee cortisone injection 07/14/2022 [de-identified] : 7.7.22 [de-identified] : rt knee [de-identified] : none [TWNoteComboBox1] : 0%

## 2022-10-27 NOTE — ASSESSMENT
[FreeTextEntry1] : 77 year F WITH MODERATE LEFT LEG/HIP PAIN THAT STARTED MID JULY 2022. WAS PRESCRIBED MDP WHICH HELPED. PAIN WORSENED WITH SITTING, BETTER WITH WALKING AND STANDING. OK LYING FLAT. PAIN INTO THIGH ONLY. HAS MORE PAIN SINCE LAST VISIT, HAS BEEN DOING MORE ACTIVITY LATELY. HAD RT KNEE CSI ON 7/14/22 WITH GOOD RELIEF. \par \par RT KNEE CSI TODAY. TIMING REVIEWED. MAY RTN FOR VISCO IN DEC\par LEFT KNEE SYNVISC #3 TODAY.

## 2023-02-09 ENCOUNTER — APPOINTMENT (OUTPATIENT)
Dept: ORTHOPEDIC SURGERY | Facility: CLINIC | Age: 78
End: 2023-02-09
Payer: MEDICARE

## 2023-02-09 VITALS — WEIGHT: 152 LBS | BODY MASS INDEX: 25.95 KG/M2 | HEIGHT: 64 IN

## 2023-02-09 PROCEDURE — 99213 OFFICE O/P EST LOW 20 MIN: CPT | Mod: 25

## 2023-02-09 PROCEDURE — J3490M: CUSTOM

## 2023-02-09 PROCEDURE — 20610 DRAIN/INJ JOINT/BURSA W/O US: CPT | Mod: 50

## 2023-02-09 NOTE — ASSESSMENT
[FreeTextEntry1] : 77 year F WITH MODERATE LEFT LEG/HIP PAIN THAT STARTED MID JULY 2022. WAS PRESCRIBED MDP WHICH HELPED. PAIN WORSENED WITH SITTING, BETTER WITH WALKING AND STANDING. OK LYING FLAT. PAIN INTO THIGH ONLY. HAS MORE PAIN SINCE LAST VISIT, HAS BEEN DOING MORE ACTIVITY LATELY. HAD RT KNEE CSI ON 7/14/22 WITH GOOD RELIEF. \par \par BILATERAL KNEE CORTISONE TODAY. TIMING REVIEWED

## 2023-02-09 NOTE — PROCEDURE
[Large Joint Injection] : Large joint injection [Knee] : knee [de-identified] : Procedure Name: Large Joint Injection / Aspiration: Depomedrol and Marcaine\par Anesthesia: ethyl chloride sprayed topically.. \par Depomedrol: An injection of Depomedrol 80 mg , 1 cc. \par Marcaine: 5 cc. \par Medication was injected in bilateral knees. Patient has tried OTC's including aspirin, Ibuprofen, Aleve etc or prescriptionNSAIDS, and/or exercises at home and/ or physical therapy without satisfactory response. After verbal consent using sterile preparation and technique. The risks, benefits, and alternatives to cortisone injection were explained in full to the patient. Risks outlined include but are not limited to infection, sepsis, bleeding, scarring, skin discoloration, temporary  increase in pain, syncopal episode, failure to resolve symptoms, allergic reaction, symptom recurrence, and elevation of blood sugar in diabetics. Patient understood the risks. All questions were answered. After discussion of options, patient requested an injection. Oral informed consent was obtained and sterile prep was done of the injection site. Sterile technique was utilized for the procedure including the preparation of the solutions used for the injection. Patient tolerated the procedure well. Advised to ice the injection site this evening. Prep with alcohol locally to site. Sterile technique used. Post Procedure Instructions: Patient was advised to call if redness, pain, or fever occur and apply ice for 15 min. out of every hour for the next 12-24 hours as tolerated.

## 2023-02-09 NOTE — IMAGING
[de-identified] : Left Hip/Thigh: Inspection of the hip/thigh is as follows: Inspection shows no swelling and no\par ecchymosis. Inspection of the wound reveals intact skin and no sign of infection. Range of motion of the hip is as\par follows: Patient displays good endpoint with internal rotation at 15 degrees Strength testing of the hip/thigh is as\par follows: Hip flexion strength is 5/5, Hip extension strength is 5/5, Hip abduction strength is 5/5 and Hip\par adduction strength is 5/5. Neurological testing of the hip/thigh is as follows: decreased sensation laterally. Gait\par and function is as follows: patient ambulates without assistive device. \par \par Right Knee: Inspection of the knee is as follows: no effusion, erythema, ecchymosis, scars or deformities. Palpation\par of the knee is as follows: medial joint line tenderness, medial femoral condyle tenderness, medial tibial\par plateau tenderness and patellar compression tenderness. Strength examination of the knee is as\par follows: Quadriceps strength is 4-/5 Ligament Stability and Special Test ligamentously stable.\par \par left knee: crepitus upon knee cap with medial and lateral t line tenderness. 0-130 with some flexion discomfort\par Left hip: No pain with rotation today

## 2023-02-09 NOTE — HISTORY OF PRESENT ILLNESS
[Left Leg] : left leg [Right Leg] : right leg [7] : 7 [6] : 6 [Dull/Aching] : dull/aching [Localized] : localized [Sharp] : sharp [Intermittent] : intermittent [Household chores] : household chores [Leisure] : leisure [Rest] : rest [Injection therapy] : injection therapy [Standing] : standing [Walking] : walking [Stairs] : stairs [1] : 1 [Synvisc] : Synvisc [] : Post Surgical Visit: no [FreeTextEntry1] : knees [de-identified] : right knee cortisone injection 07/14/2022 [de-identified] : 7.7.22 [de-identified] : rt knee [de-identified] : none [TWNoteComboBox1] : 0%

## 2023-03-28 RX ORDER — KETOPROFEN 100 %
POWDER (GRAM) MISCELLANEOUS
Qty: 30 | Refills: 3 | Status: ACTIVE | COMMUNITY
Start: 2023-03-24 | End: 1900-01-01

## 2023-04-10 ENCOUNTER — APPOINTMENT (OUTPATIENT)
Dept: ORTHOPEDIC SURGERY | Facility: CLINIC | Age: 78
End: 2023-04-10
Payer: MEDICARE

## 2023-04-10 VITALS — HEIGHT: 64 IN | WEIGHT: 160 LBS | BODY MASS INDEX: 27.31 KG/M2

## 2023-04-10 PROCEDURE — 73030 X-RAY EXAM OF SHOULDER: CPT | Mod: LT

## 2023-04-10 PROCEDURE — 99213 OFFICE O/P EST LOW 20 MIN: CPT | Mod: 25

## 2023-04-10 PROCEDURE — 20611 DRAIN/INJ JOINT/BURSA W/US: CPT

## 2023-04-10 PROCEDURE — 99214 OFFICE O/P EST MOD 30 MIN: CPT | Mod: 25

## 2023-04-10 PROCEDURE — 73010 X-RAY EXAM OF SHOULDER BLADE: CPT | Mod: LT

## 2023-04-10 NOTE — IMAGING
[FreeTextEntry1] : There are AC cysts and spurs. The GH joint is ok.  [FreeTextEntry5] : There is a type I - II acromion.

## 2023-04-10 NOTE — ASSESSMENT
[FreeTextEntry1] : We discussed the underlying pathology. \par Treatment options reviewed. \par As she is already on celebrex, we agreed on a left SA injection.\par PT is prescribed. \par If symptoms persist, an MRI is planned. \par Cautions discussed. \par Questions answered. \par \par Patient seen by Tone Titus M.D.\par Entered by Martha Flores acting as scribe. \par \par \par Procedure Name: Large Joint Injection / Aspiration: Depomedrol, Lidocaine and Guidance Ultrasound\par \par Large Joint Injection was performed because of pain and inflammation.\par Depomedrol: An injection of Depomedrol 40 mg , 2 cc.\par Lidocaine: An injection of Lidocaine 1 mg , 13 cc.\par \par Medication was injected in the left subacromial space. Patient has tried OTC's including aspirin, Ibuprofen, Aleve etc or prescription NSAIDS, and/or exercises at home and/ or physical therapy without satisfactory response. The risks, benefits, and alternatives to steroid injection were explained in full to the patient. Risks outlined include but are not limited to infection, sepsis, bleeding, scarring, skin discoloration, temporary increase in pain, syncopal episode, failure to resolve symptoms, allergic reaction, symptom recurrence, and elevation of blood sugar in diabetics. Patient understood the risks. All questions were answered. After discussion, patient requested an injection. Oral informed consent was obtained.  Sterile preparation with betadine and aseptic technique was utilized for the procedure, including the preparation of the solutions used for the injection. Patient tolerated the procedure well.  \par Post Procedure Instructions: Patient was advised to call if redness, pain, or fever occur and apply ice for 15 min. out of every hour for the next 12-24 hours as tolerated. Patient was advised to rest the joint(s) for 3 days.  Advised to ice the injection site this evening.\par Ultrasound Guidance was used for the following reasons: for precise injection in area of tear. Visualization of the needle and placement of injection was performed without complication. 
No

## 2023-04-10 NOTE — PHYSICAL EXAM
[Left] : left shoulder [Moderate] : moderate [5 ___] : forward flexion 5[unfilled]/5 [5___] : external rotation 5[unfilled]/5 [] : no sensory deficits [Right] : right shoulder [Sitting] : sitting [FreeTextEntry8] : Tenderness at superior medial scapular boarder [FreeTextEntry9] : IR to T12. [TWNoteComboBox4] : passive forward flexion 165 degrees [de-identified] : external rotation 75 degrees

## 2023-04-10 NOTE — REASON FOR VISIT
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 No prior history/treatment. Reaching is painful. Night symptoms can occur. There is no n/t. NSAID use as needed with temporary relief.  Yes

## 2023-04-10 NOTE — HISTORY OF PRESENT ILLNESS
[7] : 7 [1] : 2 [Rest] : rest [Meds] : meds [] : no [FreeTextEntry1] : left shoulder  [FreeTextEntry5] : pt has been having left shoulder pain for about a month now  [FreeTextEntry9] : celebrex  [de-identified] : movement

## 2023-05-02 ENCOUNTER — APPOINTMENT (OUTPATIENT)
Dept: ORTHOPEDIC SURGERY | Facility: CLINIC | Age: 78
End: 2023-05-02
Payer: MEDICARE

## 2023-05-02 DIAGNOSIS — Z96.641 PRESENCE OF RIGHT ARTIFICIAL HIP JOINT: ICD-10-CM

## 2023-05-02 PROCEDURE — J3490M: CUSTOM

## 2023-05-02 PROCEDURE — 99214 OFFICE O/P EST MOD 30 MIN: CPT | Mod: 25

## 2023-05-02 PROCEDURE — 20610 DRAIN/INJ JOINT/BURSA W/O US: CPT | Mod: 50

## 2023-05-02 NOTE — PROCEDURE
[Large Joint Injection] : Large joint injection [de-identified] : Procedure Name: Large Joint Injection / Aspiration: Depomedrol and Marcaine Anesthesia: ethyl chloride sprayed topically..  Depomedrol: An injection of Depomedrol 80 mg , 1 cc.  Marcaine: 5 cc.  Medication was injected in bilateral knees. Patient has tried OTC's including aspirin, Ibuprofen, Aleve etc or prescriptionNSAIDS, and/or exercises at home and/ or physical therapy without satisfactory response. After verbal consent using sterile preparation and technique. The risks, benefits, and alternatives to cortisone injection were explained in full to the patient. Risks outlined include but are not limited to infection, sepsis, bleeding, scarring, skin discoloration, temporary  increase in pain, syncopal episode, failure to resolve symptoms, allergic reaction, symptom recurrence, and elevation of blood sugar in diabetics. Patient understood the risks. All questions were answered. After discussion of options, patient requested an injection. Oral informed consent was obtained and sterile prep was done of the injection site. Sterile technique was utilized for the procedure including the preparation of the solutions used for the injection. Patient tolerated the procedure well. Advised to ice the injection site this evening. Prep with alcohol locally to site. Sterile technique used. Post Procedure Instructions: Patient was advised to call if redness, pain, or fever occur and apply ice for 15 min. out of every hour for the next 12-24 hours as tolerated.

## 2023-05-02 NOTE — HISTORY OF PRESENT ILLNESS
[Left Leg] : left leg [Right Leg] : right leg [7] : 7 [6] : 6 [Dull/Aching] : dull/aching [Localized] : localized [Sharp] : sharp [Intermittent] : intermittent [Household chores] : household chores [Leisure] : leisure [Rest] : rest [Injection therapy] : injection therapy [Standing] : standing [Walking] : walking [Stairs] : stairs [1] : 1 [Synvisc] : Synvisc [] : Post Surgical Visit: no [FreeTextEntry1] : knees [de-identified] : right knee cortisone injection 07/14/2022 [de-identified] : 7.7.22 [de-identified] : rt knee [de-identified] : none [TWNoteComboBox1] : 0%

## 2023-05-02 NOTE — IMAGING
[de-identified] : Left Hip/Thigh: Inspection of the hip/thigh is as follows: Inspection shows no swelling and no\par ecchymosis. Inspection of the wound reveals intact skin and no sign of infection. Range of motion of the hip is as\par follows: Patient displays good endpoint with internal rotation at 15 degrees Strength testing of the hip/thigh is as\par follows: Hip flexion strength is 5/5, Hip extension strength is 5/5, Hip abduction strength is 5/5 and Hip\par adduction strength is 5/5. Neurological testing of the hip/thigh is as follows: decreased sensation laterally. Gait\par and function is as follows: patient ambulates without assistive device. \par \par Right Knee: Inspection of the knee is as follows: no effusion, erythema, ecchymosis, scars or deformities. Palpation\par of the knee is as follows: medial joint line tenderness, medial femoral condyle tenderness, medial tibial\par plateau tenderness and patellar compression tenderness. Strength examination of the knee is as\par follows: Quadriceps strength is 4-/5 Ligament Stability and Special Test ligamentously stable.\par \par left knee: crepitus upon knee cap with medial and lateral t line tenderness. 0-130 with some flexion discomfort\par Left hip: No pain with rotation today

## 2023-05-02 NOTE — ASSESSMENT
[FreeTextEntry1] : 78 year F WITH MODERATE LEFT LEG/HIP PAIN THAT STARTED MID JULY 2022. WAS PRESCRIBED MDP WHICH HELPED. PAIN WORSENED WITH SITTING, BETTER WITH WALKING AND STANDING. OK LYING FLAT. PAIN INTO THIGH ONLY. HAS MORE PAIN SINCE LAST VISIT, HAS BEEN DOING MORE ACTIVITY LATELY. COMPLETED LT KNEE SYNVISC SERIES ON OCTOBER 2022. HAD B/L KNEE CSI ON 2/9/23 WITH GOOD RELIEF. \par \par B/L KNEE CSI TODAY. PATIENT TOLERATED INJECTION WELL.

## 2023-06-26 ENCOUNTER — APPOINTMENT (OUTPATIENT)
Dept: ORTHOPEDIC SURGERY | Facility: CLINIC | Age: 78
End: 2023-06-26

## 2023-07-07 ENCOUNTER — APPOINTMENT (OUTPATIENT)
Dept: ORTHOPEDIC SURGERY | Facility: CLINIC | Age: 78
End: 2023-07-07
Payer: MEDICARE

## 2023-07-07 ENCOUNTER — RESULT CHARGE (OUTPATIENT)
Age: 78
End: 2023-07-07

## 2023-07-07 PROCEDURE — 99213 OFFICE O/P EST LOW 20 MIN: CPT | Mod: 25

## 2023-07-07 PROCEDURE — 20610 DRAIN/INJ JOINT/BURSA W/O US: CPT | Mod: 50

## 2023-07-07 PROCEDURE — 73564 X-RAY EXAM KNEE 4 OR MORE: CPT | Mod: 50

## 2023-07-07 NOTE — IMAGING
[de-identified] : Left Hip/Thigh: Inspection of the hip/thigh is as follows: Inspection shows no swelling and no\par ecchymosis. Inspection of the wound reveals intact skin and no sign of infection. Range of motion of the hip is as\par follows: Patient displays good endpoint with internal rotation at 15 degrees Strength testing of the hip/thigh is as\par follows: Hip flexion strength is 5/5, Hip extension strength is 5/5, Hip abduction strength is 5/5 and Hip\par adduction strength is 5/5. Neurological testing of the hip/thigh is as follows: decreased sensation laterally. Gait\par and function is as follows: patient ambulates without assistive device. \par \par Right Knee: Inspection of the knee is as follows: no effusion, erythema, ecchymosis, scars or deformities. Palpation\par of the knee is as follows: medial joint line tenderness, medial femoral condyle tenderness, medial tibial\par plateau tenderness and patellar compression tenderness. Strength examination of the knee is as\par follows: Quadriceps strength is 4-/5 Ligament Stability and Special Test ligamentously stable.\par \par left knee: crepitus upon knee cap with medial and lateral t line tenderness. 0-130 with some flexion discomfort\par Left hip: No pain with rotation today

## 2023-07-07 NOTE — ASSESSMENT
[FreeTextEntry1] : 78 year F WITH MODERATE LEFT LEG/HIP PAIN THAT STARTED MID JULY 2022. WAS PRESCRIBED MDP WHICH HELPED. PAIN WORSENED WITH SITTING, BETTER WITH WALKING AND STANDING. OK LYING FLAT. PAIN INTO THIGH ONLY. HAS MORE PAIN SINCE LAST VISIT, HAS BEEN DOING MORE ACTIVITY LATELY. COMPLETED LT KNEE SYNVISC SERIES ON OCTOBER 2022. HAD B/L KNEE CSI ON 2/9/23 WITH GOOD RELIEF. \par \par B/L KNEE SYNVISC #1 TODAY. PATIENT TOLERATED INJECTION WELL.

## 2023-07-07 NOTE — HISTORY OF PRESENT ILLNESS
[Left Leg] : left leg [Right Leg] : right leg [7] : 7 [6] : 6 [Dull/Aching] : dull/aching [Localized] : localized [Sharp] : sharp [Intermittent] : intermittent [Household chores] : household chores [Leisure] : leisure [Rest] : rest [Injection therapy] : injection therapy [Standing] : standing [Walking] : walking [Stairs] : stairs [1] : 1 [Synvisc] : Synvisc [] : Post Surgical Visit: no [FreeTextEntry1] : knees [de-identified] : right knee cortisone injection 07/14/2022 [de-identified] : 7.7.22 [de-identified] : rt knee [de-identified] : none [TWNoteComboBox1] : 0%

## 2023-07-07 NOTE — PROCEDURE
[Large Joint Injection] : Large joint injection [de-identified] : Procedure Name: Synvisc (Large Joint)\par \par Viscosupplementation Injection: X-ray evidence of Osteoarthritis on this or prior visit, Patient has tried OTC's including aspirin, Ibuprofen, Aleve etc or prescription NSAIDS, and/or exercises at home and/ or physical therapy without satisfactory response and Repeat series performed because patient had significant improvement in their pain and functional capacity from prior series which was given more than six months ago. \par \par An injection of Synvisc 2ml #1 was injected into the bilateral knee(s). The risks, benefits, and alternatives to Viscosupplementation injection were explained in full to the patient. Risks outlined include but are not limited to infection, sepsis, bleeding, scarring, skin discoloration, temporary increase in pain, syncopal episode, failure to resolve symptoms, allergic reaction, and symptom recurrence. Signs and symptoms of infection reviewed and patient advised to call immediately for redness, fevers, and/or chills. Patient understood the risks. All questions were answered. After discussion of options, patient requested Viscosupplementation. Oral informed consent was obtained and sterile prep was done of the injection site. The patient tolerated the procedure well. Ice tonight to the injection site. \par

## 2023-07-09 ENCOUNTER — APPOINTMENT (OUTPATIENT)
Dept: MRI IMAGING | Facility: CLINIC | Age: 78
End: 2023-07-09

## 2023-07-13 ENCOUNTER — APPOINTMENT (OUTPATIENT)
Dept: ORTHOPEDIC SURGERY | Facility: CLINIC | Age: 78
End: 2023-07-13
Payer: MEDICARE

## 2023-07-13 VITALS — BODY MASS INDEX: 27.31 KG/M2 | HEIGHT: 64 IN | WEIGHT: 160 LBS

## 2023-07-13 PROCEDURE — 99213 OFFICE O/P EST LOW 20 MIN: CPT | Mod: 25

## 2023-07-13 PROCEDURE — 20610 DRAIN/INJ JOINT/BURSA W/O US: CPT | Mod: 50

## 2023-07-13 NOTE — HISTORY OF PRESENT ILLNESS
[Left Leg] : left leg [Right Leg] : right leg [7] : 7 [6] : 6 [Dull/Aching] : dull/aching [Localized] : localized [Sharp] : sharp [Intermittent] : intermittent [Household chores] : household chores [Leisure] : leisure [Rest] : rest [Injection therapy] : injection therapy [Standing] : standing [Walking] : walking [Stairs] : stairs [1] : 1 [Synvisc] : Synvisc [] : Post Surgical Visit: no [FreeTextEntry1] : knees [de-identified] : right knee cortisone injection 07/14/2022 [de-identified] : 7.7.22 [de-identified] : rt knee [de-identified] : none [TWNoteComboBox1] : 0%

## 2023-07-13 NOTE — IMAGING
[de-identified] : Left Hip/Thigh: Inspection of the hip/thigh is as follows: Inspection shows no swelling and no\par ecchymosis. Inspection of the wound reveals intact skin and no sign of infection. Range of motion of the hip is as\par follows: Patient displays good endpoint with internal rotation at 15 degrees Strength testing of the hip/thigh is as\par follows: Hip flexion strength is 5/5, Hip extension strength is 5/5, Hip abduction strength is 5/5 and Hip\par adduction strength is 5/5. Neurological testing of the hip/thigh is as follows: decreased sensation laterally. Gait\par and function is as follows: patient ambulates without assistive device. \par \par Right Knee: Inspection of the knee is as follows: no effusion, erythema, ecchymosis, scars or deformities. Palpation\par of the knee is as follows: medial joint line tenderness, medial femoral condyle tenderness, medial tibial\par plateau tenderness and patellar compression tenderness. Strength examination of the knee is as\par follows: Quadriceps strength is 4-/5 Ligament Stability and Special Test ligamentously stable.\par \par left knee: crepitus upon knee cap with medial and lateral t line tenderness. 0-130 with some flexion discomfort\par Left hip: No pain with rotation today

## 2023-07-13 NOTE — PROCEDURE
[Large Joint Injection] : Large joint injection [de-identified] : Procedure Name: Synvisc (Large Joint)\par \par Viscosupplementation Injection: X-ray evidence of Osteoarthritis on this or prior visit, Patient has tried OTC's including aspirin, Ibuprofen, Aleve etc or prescription NSAIDS, and/or exercises at home and/ or physical therapy without satisfactory response and Repeat series performed because patient had significant improvement in their pain and functional capacity from prior series which was given more than six months ago. \par \par An injection of Synvisc 2ml #2 was injected into the bilateral knee(s). The risks, benefits, and alternatives to Viscosupplementation injection were explained in full to the patient. Risks outlined include but are not limited to infection, sepsis, bleeding, scarring, skin discoloration, temporary increase in pain, syncopal episode, failure to resolve symptoms, allergic reaction, and symptom recurrence. Signs and symptoms of infection reviewed and patient advised to call immediately for redness, fevers, and/or chills. Patient understood the risks. All questions were answered. After discussion of options, patient requested Viscosupplementation. Oral informed consent was obtained and sterile prep was done of the injection site. The patient tolerated the procedure well. Ice tonight to the injection site. \par

## 2023-07-13 NOTE — ASSESSMENT
[FreeTextEntry1] : 78 year F WITH MODERATE LEFT LEG/HIP PAIN THAT STARTED MID JULY 2022. WAS PRESCRIBED MDP WHICH HELPED. PAIN WORSENED WITH SITTING, BETTER WITH WALKING AND STANDING. OK LYING FLAT. PAIN INTO THIGH ONLY. HAS MORE PAIN SINCE LAST VISIT, HAS BEEN DOING MORE ACTIVITY LATELY. COMPLETED LT KNEE SYNVISC SERIES ON OCTOBER 2022. HAD B/L KNEE CSI ON 2/9/23 WITH GOOD RELIEF. \par \par B/L KNEE SYNVISC #2 TODAY. PATIENT TOLERATED INJECTION WELL.

## 2023-07-14 ENCOUNTER — APPOINTMENT (OUTPATIENT)
Dept: ORTHOPEDIC SURGERY | Facility: CLINIC | Age: 78
End: 2023-07-14

## 2023-07-20 ENCOUNTER — APPOINTMENT (OUTPATIENT)
Dept: ORTHOPEDIC SURGERY | Facility: CLINIC | Age: 78
End: 2023-07-20
Payer: MEDICARE

## 2023-07-20 DIAGNOSIS — M17.12 UNILATERAL PRIMARY OSTEOARTHRITIS, LEFT KNEE: ICD-10-CM

## 2023-07-20 PROCEDURE — 20610 DRAIN/INJ JOINT/BURSA W/O US: CPT | Mod: 50

## 2023-07-20 PROCEDURE — 99213 OFFICE O/P EST LOW 20 MIN: CPT | Mod: 25

## 2023-07-20 NOTE — ASSESSMENT
[FreeTextEntry1] : 78 year F WITH MODERATE LEFT LEG/HIP PAIN THAT STARTED MID JULY 2022. WAS PRESCRIBED MDP WHICH HELPED. PAIN WORSENED WITH SITTING, BETTER WITH WALKING AND STANDING. OK LYING FLAT. PAIN INTO THIGH ONLY. HAS MORE PAIN SINCE LAST VISIT, HAS BEEN DOING MORE ACTIVITY LATELY. COMPLETED LT KNEE SYNVISC SERIES ON OCTOBER 2022. HAD B/L KNEE CSI ON 2/9/23 WITH GOOD RELIEF. \par \par B/L KNEE SYNVISC #3 TODAY. PATIENT TOLERATED INJECTION WELL.

## 2023-07-20 NOTE — HISTORY OF PRESENT ILLNESS
[Left Leg] : left leg [Right Leg] : right leg [7] : 7 [6] : 6 [Dull/Aching] : dull/aching [Localized] : localized [Sharp] : sharp [Intermittent] : intermittent [Household chores] : household chores [Leisure] : leisure [Rest] : rest [Injection therapy] : injection therapy [Standing] : standing [Walking] : walking [Stairs] : stairs [1] : 1 [Synvisc] : Synvisc [] : Post Surgical Visit: no [FreeTextEntry1] : knees [de-identified] : right knee cortisone injection 07/14/2022 [de-identified] : 7.7.22 [de-identified] : rt knee [de-identified] : none [TWNoteComboBox1] : 0%

## 2023-07-20 NOTE — IMAGING
[de-identified] : Left Hip/Thigh: Inspection of the hip/thigh is as follows: Inspection shows no swelling and no\par ecchymosis. Inspection of the wound reveals intact skin and no sign of infection. Range of motion of the hip is as\par follows: Patient displays good endpoint with internal rotation at 15 degrees Strength testing of the hip/thigh is as\par follows: Hip flexion strength is 5/5, Hip extension strength is 5/5, Hip abduction strength is 5/5 and Hip\par adduction strength is 5/5. Neurological testing of the hip/thigh is as follows: decreased sensation laterally. Gait\par and function is as follows: patient ambulates without assistive device. \par \par Right Knee: Inspection of the knee is as follows: no effusion, erythema, ecchymosis, scars or deformities. Palpation\par of the knee is as follows: medial joint line tenderness, medial femoral condyle tenderness, medial tibial\par plateau tenderness and patellar compression tenderness. Strength examination of the knee is as\par follows: Quadriceps strength is 4-/5 Ligament Stability and Special Test ligamentously stable.\par \par left knee: crepitus upon knee cap with medial and lateral t line tenderness. 0-130 with some flexion discomfort\par Left hip: No pain with rotation today

## 2023-07-20 NOTE — PROCEDURE
[Large Joint Injection] : Large joint injection [de-identified] : Procedure Name: Synvisc (Large Joint)\par \par Viscosupplementation Injection: X-ray evidence of Osteoarthritis on this or prior visit, Patient has tried OTC's including aspirin, Ibuprofen, Aleve etc or prescription NSAIDS, and/or exercises at home and/ or physical therapy without satisfactory response and Repeat series performed because patient had significant improvement in their pain and functional capacity from prior series which was given more than six months ago. \par \par An injection of Synvisc 2ml #3 was injected into the bilateral knee(s). The risks, benefits, and alternatives to Viscosupplementation injection were explained in full to the patient. Risks outlined include but are not limited to infection, sepsis, bleeding, scarring, skin discoloration, temporary increase in pain, syncopal episode, failure to resolve symptoms, allergic reaction, and symptom recurrence. Signs and symptoms of infection reviewed and patient advised to call immediately for redness, fevers, and/or chills. Patient understood the risks. All questions were answered. After discussion of options, patient requested Viscosupplementation. Oral informed consent was obtained and sterile prep was done of the injection site. The patient tolerated the procedure well. Ice tonight to the injection site. \par

## 2023-07-24 ENCOUNTER — APPOINTMENT (OUTPATIENT)
Dept: ORTHOPEDIC SURGERY | Facility: CLINIC | Age: 78
End: 2023-07-24

## 2023-09-21 ASSESSMENT — HOOS JR
IMPORTED FORM: YES
GOING UP OR DOWN STAIRS: SEVERE
HOOS JR RAW SCORE: 16
SITTING: MILD
RISING FROM SITTING: SEVERE
BENDING TO THE FLOOR TO PICK UP OBJECT: SEVERE
LYING IN BED (TURNING OVER, MAINTAINING HIP POSITION): SEVERE
IMPORTED HOOS JR SCORE: 16.0
WALKING ON UNEVEN SURFACE: SEVERE

## 2023-09-28 ENCOUNTER — APPOINTMENT (OUTPATIENT)
Dept: ORTHOPEDIC SURGERY | Facility: CLINIC | Age: 78
End: 2023-09-28
Payer: MEDICARE

## 2023-09-28 DIAGNOSIS — M71.20 SYNOVIAL CYST OF POPLITEAL SPACE [BAKER], UNSPECIFIED KNEE: ICD-10-CM

## 2023-09-28 DIAGNOSIS — M71.21 SYNOVIAL CYST OF POPLITEAL SPACE [BAKER], RIGHT KNEE: ICD-10-CM

## 2023-09-28 DIAGNOSIS — M17.11 UNILATERAL PRIMARY OSTEOARTHRITIS, RIGHT KNEE: ICD-10-CM

## 2023-09-28 PROCEDURE — J3490M: CUSTOM

## 2023-09-28 PROCEDURE — 99213 OFFICE O/P EST LOW 20 MIN: CPT | Mod: 25

## 2023-09-28 PROCEDURE — 20610 DRAIN/INJ JOINT/BURSA W/O US: CPT | Mod: RT

## 2023-12-01 ENCOUNTER — APPOINTMENT (OUTPATIENT)
Dept: ORTHOPEDIC SURGERY | Facility: CLINIC | Age: 78
End: 2023-12-01
Payer: MEDICARE

## 2023-12-01 VITALS — WEIGHT: 160 LBS | BODY MASS INDEX: 27.31 KG/M2 | HEIGHT: 64 IN

## 2023-12-01 PROCEDURE — ZZZZZ: CPT

## 2023-12-01 RX ORDER — METHYLPREDNISOLONE 4 MG/1
4 TABLET ORAL
Qty: 1 | Refills: 0 | Status: ACTIVE | COMMUNITY
Start: 2023-12-01 | End: 1900-01-01

## 2023-12-15 ENCOUNTER — APPOINTMENT (OUTPATIENT)
Dept: ORTHOPEDIC SURGERY | Facility: CLINIC | Age: 78
End: 2023-12-15
Payer: MEDICARE

## 2023-12-15 VITALS — BODY MASS INDEX: 27.31 KG/M2 | HEIGHT: 64 IN | WEIGHT: 160 LBS

## 2023-12-15 PROCEDURE — ZZZZZ: CPT

## 2023-12-15 NOTE — PHYSICAL EXAM
[Left] : left shoulder [Moderate] : moderate [5 ___] : forward flexion 5[unfilled]/5 [5___] : external rotation 5[unfilled]/5 [Right] : right shoulder [Sitting] : sitting [] : no sensory deficits [FreeTextEntry8] : Tenderness at superior medial scapular boarder [FreeTextEntry9] : IR to T12. [TWNoteComboBox4] : passive forward flexion 165 degrees [de-identified] : external rotation 75 degrees

## 2023-12-15 NOTE — DATA REVIEWED
[FreeTextEntry1] : MRI L SHOULDER [facility] 12/7/23:  There is no major rotator cuff tear.  There is biceps tendinosis.  The muscle is decent.  There are AC changes.

## 2023-12-15 NOTE — ASSESSMENT
[FreeTextEntry1] : We reviewed the MRI findings and her options. An injection is planned today. She will finally begin PT. Cautions advised. Questions addressed.  Procedure Name: Large Joint Injection / Aspiration: Depomedrol, Lidocaine   Large Joint Injection was performed because of pain and inflammation. Depomedrol: An injection of Depomedrol 40 mg , 2 cc. Lidocaine: An injection of Lidocaine 1 mg , 13 cc.   Medication was injected in the left subacromial space and glenohumeral joint. Patient has tried OTC's including aspirin, Ibuprofen, Aleve etc. or prescription NSAIDS, and/or exercises at home and/ or physical therapy without satisfactory response. The risks, benefits, and alternatives to steroid injection were explained in full to the patient. Risks outlined include but are not limited to infection, sepsis, bleeding, scarring, skin discoloration, temporary increase in pain, syncopal episode, failure to resolve symptoms, allergic reaction, symptom recurrence, and elevation of blood sugar in diabetics. Patient understood the risks. All questions were answered. After discussion, patient requested an injection. Oral informed consent was obtained.  Sterile preparation with betadine and aseptic technique was utilized for the procedure, including the preparation of the solutions used for the injection. Patient tolerated the procedure well.  Post Procedure Instructions: Patient was advised to call if redness, pain, or fever occur and apply ice for 15 min. out of every hour for the next 12-24 hours as tolerated. Patient was advised to rest the joint(s) for 3 days.  Advised to ice the injection site this evening.  Patient was seen by Dr. Tone Titus. Patient was seen by Wendy FLAHERTY under the supervision of Dr. Tone Titus. Progress note was completed by Wendy FLAHERTY.

## 2023-12-15 NOTE — REASON FOR VISIT
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 No prior history/treatment. Reaching is painful. Night symptoms can occur. There is no n/t. NSAID use as needed with temporary relief.  She felt relief from SA injection 4/10/23.  But she did not go to PT.  Pain returned mid November 2023.  The MDP helped part of her shoulder pain.  The MRI was done.

## 2023-12-15 NOTE — HISTORY OF PRESENT ILLNESS
[Rest] : rest [Meds] : meds [de-identified] : Here for left shoulder MRI review. MDP gave some relief. [] : no [FreeTextEntry1] : left shoulder  [FreeTextEntry9] : celebrex  [de-identified] : movement  [de-identified] : MDP

## 2024-02-09 ENCOUNTER — APPOINTMENT (OUTPATIENT)
Dept: ORTHOPEDIC SURGERY | Facility: CLINIC | Age: 79
End: 2024-02-09
Payer: MEDICARE

## 2024-02-09 VITALS — WEIGHT: 160 LBS | BODY MASS INDEX: 27.31 KG/M2 | HEIGHT: 64 IN

## 2024-02-09 DIAGNOSIS — M75.42 IMPINGEMENT SYNDROME OF LEFT SHOULDER: ICD-10-CM

## 2024-02-09 PROCEDURE — 99213 OFFICE O/P EST LOW 20 MIN: CPT

## 2024-02-09 RX ORDER — CELECOXIB 200 MG/1
200 CAPSULE ORAL TWICE DAILY
Qty: 30 | Refills: 0 | Status: COMPLETED | COMMUNITY
Start: 2024-02-09 | End: 2024-02-24

## 2024-02-09 NOTE — HISTORY OF PRESENT ILLNESS
[Rest] : rest [Meds] : meds [de-identified] : Here for left shoulder follow up. Only did PT 2x. Notes some improvement in pain since last visit.  [] : no [FreeTextEntry1] : left shoulder  [FreeTextEntry9] : celebrex  [de-identified] : movement  [de-identified] : PT, CSI

## 2024-02-09 NOTE — PHYSICAL EXAM
[Left] : left shoulder [Moderate] : moderate [5 ___] : forward flexion 5[unfilled]/5 [5___] : external rotation 5[unfilled]/5 [Right] : right shoulder [Sitting] : sitting [] : no sensory deficits [FreeTextEntry9] : IR to T12. [TWNoteComboBox4] : passive forward flexion 165 degrees [de-identified] : external rotation 75 degrees No

## 2024-02-09 NOTE — REASON FOR VISIT
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 No prior history. There is no n/t. NSAID use as needed with temporary relief.  She felt relief from SA injection 4/10/23.  But she did not go to PT.  Pain returned mid November 2023.  The MDP helped part of her shoulder pain.  The MRI was done.  The L SA/GH injection on 12/15/23 helped a little.  She went to limited PT as she had more pain.  She has issues sleeping, but many things bother her at night.  She feels about 55% better.

## 2024-02-09 NOTE — ASSESSMENT
[FreeTextEntry1] : Course reviewed. Short term vs. long term issues discussed. Ergonomic issues discussed. Frequency of injections was discussed which I am discouraging.  The patient is prescribed Celebrex 200mg.  Cautions advised. Questions addressed.  Patient seen by Tone Titus Shoulder Surgery  The documentation recorded by the scribe accurately reflects the service I personally performed and the decisions made by me. Entered by Gumaro Rob acting as scribe.

## 2024-04-02 ENCOUNTER — APPOINTMENT (OUTPATIENT)
Dept: ORTHOPEDIC SURGERY | Facility: CLINIC | Age: 79
End: 2024-04-02
Payer: MEDICARE

## 2024-04-02 VITALS — HEIGHT: 64 IN | BODY MASS INDEX: 27.31 KG/M2 | WEIGHT: 160 LBS

## 2024-04-02 DIAGNOSIS — M65.331 TRIGGER FINGER, RIGHT MIDDLE FINGER: ICD-10-CM

## 2024-04-02 DIAGNOSIS — Z87.891 PERSONAL HISTORY OF NICOTINE DEPENDENCE: ICD-10-CM

## 2024-04-02 PROCEDURE — J3490M: CUSTOM

## 2024-04-02 PROCEDURE — 20550 NJX 1 TENDON SHEATH/LIGAMENT: CPT | Mod: RT

## 2024-04-02 PROCEDURE — 99213 OFFICE O/P EST LOW 20 MIN: CPT | Mod: 25

## 2024-04-02 NOTE — HISTORY OF PRESENT ILLNESS
[de-identified] : HARPAL CHEN trigger  [5] : 5 [Dull/Aching] : dull/aching [Ice] : ice [] : no [FreeTextEntry1] : R hand [FreeTextEntry5] : R mf locks for last few weeks no injury [de-identified] : activity

## 2024-04-02 NOTE — PHYSICAL EXAM
[de-identified] : R hand:  Mild swelling  Tender 3rd A1 pulley  Decreased middle ROM  +middle triggering

## 2024-04-02 NOTE — ASSESSMENT
[FreeTextEntry1] : R MF Trigger finger tendon sheath injection was performed because of pain inflammation and stiffness Anesthesia: ethyl chloride sprayed topically Celestone 6mg: An injection of Celestone 1cc Lidocaine: An injection of Lidocaine 1% 1cc Marcaine: An injection of Marcaine 0.5% 1cc  Patient has tried OTC's including aspirin, Ibuprofen, Aleve etc or prescription NSAIDS, and/or exercises at home and/ or physical therapy without satisfactory response. After verbal consent using sterile preparation and technique. The risks, benefits, and alternatives to cortisone injection were explained in full to the patient. Risks outlined include but are not limited to infection, sepsis, bleeding, scarring, skin discoloration, temporary increase in pain, syncopal episode, failure to resolve symptoms, allergic reaction, symptom recurrence, and elevation of blood sugar in diabetics. Patient understood the risks. All questions were answered. After discussion of options, patient requested an injection. Oral informed consent was obtained and sterile prep was done of the injection site. Sterile technique was utilized for the procedure including the preparation of the solutions used for the injection. Patient tolerated the procedure well. Advised to ice the injection site this evening. Prep with betadine locally to site. Sterile technique used

## 2024-05-02 ENCOUNTER — APPOINTMENT (OUTPATIENT)
Dept: ORTHOPEDIC SURGERY | Facility: CLINIC | Age: 79
End: 2024-05-02

## 2024-07-09 ENCOUNTER — APPOINTMENT (OUTPATIENT)
Dept: ORTHOPEDIC SURGERY | Facility: CLINIC | Age: 79
End: 2024-07-09
Payer: MEDICARE

## 2024-07-09 VITALS — BODY MASS INDEX: 27.31 KG/M2 | WEIGHT: 160 LBS | HEIGHT: 64 IN

## 2024-07-09 DIAGNOSIS — M65.331 TRIGGER FINGER, RIGHT MIDDLE FINGER: ICD-10-CM

## 2024-07-09 PROCEDURE — 99213 OFFICE O/P EST LOW 20 MIN: CPT | Mod: 25

## 2024-07-09 PROCEDURE — J3490M: CUSTOM

## 2024-07-09 PROCEDURE — 20550 NJX 1 TENDON SHEATH/LIGAMENT: CPT | Mod: RT

## 2024-09-05 ENCOUNTER — APPOINTMENT (OUTPATIENT)
Dept: ORTHOPEDIC SURGERY | Facility: CLINIC | Age: 79
End: 2024-09-05
Payer: MEDICARE

## 2024-09-05 DIAGNOSIS — M17.11 UNILATERAL PRIMARY OSTEOARTHRITIS, RIGHT KNEE: ICD-10-CM

## 2024-09-05 DIAGNOSIS — M17.12 UNILATERAL PRIMARY OSTEOARTHRITIS, LEFT KNEE: ICD-10-CM

## 2024-09-05 DIAGNOSIS — M16.12 UNILATERAL PRIMARY OSTEOARTHRITIS, LEFT HIP: ICD-10-CM

## 2024-09-05 PROCEDURE — J3490M: CUSTOM | Mod: JZ

## 2024-09-05 PROCEDURE — 20610 DRAIN/INJ JOINT/BURSA W/O US: CPT | Mod: RT

## 2024-09-05 PROCEDURE — 73564 X-RAY EXAM KNEE 4 OR MORE: CPT | Mod: 50

## 2024-09-05 PROCEDURE — 99212 OFFICE O/P EST SF 10 MIN: CPT | Mod: 25

## 2024-09-05 PROCEDURE — 99214 OFFICE O/P EST MOD 30 MIN: CPT

## 2024-09-05 NOTE — IMAGING
[Bilateral] : knee bilaterally [All Views] : anteroposterior, lateral, skyline, and anteroposterior standing [Degenerative change] : Degenerative change [de-identified] : Left Hip/Thigh: Inspection of the hip/thigh is as follows: Inspection shows no swelling and no\par  ecchymosis. Inspection of the wound reveals intact skin and no sign of infection. Range of motion of the hip is as\par  follows: Patient displays good endpoint with internal rotation at 15 degrees Strength testing of the hip/thigh is as\par  follows: Hip flexion strength is 5/5, Hip extension strength is 5/5, Hip abduction strength is 5/5 and Hip\par  adduction strength is 5/5. Neurological testing of the hip/thigh is as follows: decreased sensation laterally. Gait\par  and function is as follows: patient ambulates without assistive device. \par  \par  Right Knee: Inspection of the knee is as follows: no effusion, erythema, ecchymosis, scars or deformities. Palpation\par  of the knee is as follows: medial joint line tenderness, medial femoral condyle tenderness, medial tibial\par  plateau tenderness and patellar compression tenderness. Strength examination of the knee is as\par  follows: Quadriceps strength is 4-/5 Ligament Stability and Special Test ligamentously stable.\par  \par  left knee: crepitus upon knee cap with medial and lateral t line tenderness. 0-130 with some flexion discomfort\par  Left hip: No pain with rotation today

## 2024-09-05 NOTE — HISTORY OF PRESENT ILLNESS
[Left Leg] : left leg [Right Leg] : right leg [7] : 7 [6] : 6 [Dull/Aching] : dull/aching [Localized] : localized [Sharp] : sharp [Intermittent] : intermittent [Household chores] : household chores [Leisure] : leisure [Rest] : rest [Injection therapy] : injection therapy [Standing] : standing [Walking] : walking [Stairs] : stairs [1] : 1 [Synvisc] : Synvisc [] : Post Surgical Visit: no [FreeTextEntry1] : knees [de-identified] : right knee cortisone injection 07/14/2022 [de-identified] : 7.7.22 [de-identified] : rt knee [de-identified] : none [TWNoteComboBox1] : 0%

## 2024-09-05 NOTE — PROCEDURE
[de-identified] : Procedure Name: Large Joint Injection / Aspiration: Depomedrol and Marcaine Anesthesia: ethyl chloride sprayed topically..  Depomedrol: An injection of Depomedrol 80 mg , 1 cc.  Marcaine: 5 cc.  Medication was injected in the right knee. Patient has tried OTC's including aspirin, Ibuprofen, Aleve etc or prescription NSAIDS, and/or exercises at home and/ or physical therapy without satisfactory response. After verbal consent using sterile preparation and technique. The risks, benefits, and alternatives to cortisone injection were explained in full to the patient. Risks outlined include but are not limited to infection, sepsis, bleeding, scarring, skin discoloration, temporary  increase in pain, syncopal episode, failure to resolve symptoms, allergic reaction, symptom recurrence, and elevation of blood sugar in diabetics. Patient understood the risks. All questions were answered. After discussion of options, patient requested an injection. Oral informed consent was obtained and sterile prep was done of the injection site. Sterile technique was utilized for the procedure including the preparation of the solutions used for the injection. Patient tolerated the procedure well. Advised to ice the injection site this evening. Prep with alcohol locally to site. Sterile technique used. Post Procedure Instructions: Patient was advised to call if redness, pain, or fever occur and apply ice for 15 min. out of every hour for the next 12-24 hours as tolerated.

## 2024-09-05 NOTE — ASSESSMENT
[FreeTextEntry1] : 78 year F WITH MODERATE LEFT LEG/HIP PAIN THAT STARTED MID JULY 2022. WAS PRESCRIBED MDP WHICH HELPED. PAIN WORSENED WITH SITTING, BETTER WITH WALKING AND STANDING. OK LYING FLAT. PAIN INTO THIGH ONLY. HAS MORE PAIN SINCE LAST VISIT, HAS BEEN DOING MORE ACTIVITY LATELY. COMPLETED LT KNEE SYNVISC SERIES ON OCTOBER 2022. HAD B/L KNEE CSI ON 2/9/23 WITH GOOD RELIEF.  RIGHT KNEE OA WILL GIVE CORTISONE INJECTION TODAY.  will request bilateral knee synvisc

## 2024-10-08 ENCOUNTER — APPOINTMENT (OUTPATIENT)
Dept: ORTHOPEDIC SURGERY | Facility: CLINIC | Age: 79
End: 2024-10-08
Payer: MEDICARE

## 2024-10-08 PROCEDURE — 99214 OFFICE O/P EST MOD 30 MIN: CPT | Mod: 25

## 2024-10-08 PROCEDURE — 20610 DRAIN/INJ JOINT/BURSA W/O US: CPT | Mod: 50

## 2024-10-15 ENCOUNTER — APPOINTMENT (OUTPATIENT)
Dept: ORTHOPEDIC SURGERY | Facility: CLINIC | Age: 79
End: 2024-10-15
Payer: MEDICARE

## 2024-10-15 PROCEDURE — 99213 OFFICE O/P EST LOW 20 MIN: CPT | Mod: 25

## 2024-10-15 PROCEDURE — 20610 DRAIN/INJ JOINT/BURSA W/O US: CPT | Mod: 50

## 2024-10-22 ENCOUNTER — APPOINTMENT (OUTPATIENT)
Dept: ORTHOPEDIC SURGERY | Facility: CLINIC | Age: 79
End: 2024-10-22
Payer: MEDICARE

## 2024-10-22 DIAGNOSIS — M17.11 UNILATERAL PRIMARY OSTEOARTHRITIS, RIGHT KNEE: ICD-10-CM

## 2024-10-22 DIAGNOSIS — M17.12 UNILATERAL PRIMARY OSTEOARTHRITIS, LEFT KNEE: ICD-10-CM

## 2024-10-22 PROCEDURE — 20610 DRAIN/INJ JOINT/BURSA W/O US: CPT | Mod: 50

## 2024-11-20 ENCOUNTER — APPOINTMENT (OUTPATIENT)
Dept: ORTHOPEDIC SURGERY | Facility: CLINIC | Age: 79
End: 2024-11-20

## 2024-11-20 DIAGNOSIS — M65.331 TRIGGER FINGER, RIGHT MIDDLE FINGER: ICD-10-CM

## 2024-11-20 PROCEDURE — 99203 OFFICE O/P NEW LOW 30 MIN: CPT | Mod: 25

## 2024-11-20 PROCEDURE — 99213 OFFICE O/P EST LOW 20 MIN: CPT | Mod: 25

## 2024-11-20 PROCEDURE — 73140 X-RAY EXAM OF FINGER(S): CPT | Mod: RT

## 2024-11-20 PROCEDURE — 20550 NJX 1 TENDON SHEATH/LIGAMENT: CPT | Mod: RT

## 2025-01-23 ENCOUNTER — APPOINTMENT (OUTPATIENT)
Dept: ORTHOPEDIC SURGERY | Facility: CLINIC | Age: 80
End: 2025-01-23

## 2025-01-23 DIAGNOSIS — M17.11 UNILATERAL PRIMARY OSTEOARTHRITIS, RIGHT KNEE: ICD-10-CM

## 2025-01-23 DIAGNOSIS — M17.12 UNILATERAL PRIMARY OSTEOARTHRITIS, LEFT KNEE: ICD-10-CM

## 2025-01-23 PROCEDURE — 20610 DRAIN/INJ JOINT/BURSA W/O US: CPT | Mod: RT

## 2025-01-23 PROCEDURE — 99213 OFFICE O/P EST LOW 20 MIN: CPT | Mod: 25

## 2025-01-23 PROCEDURE — J3490M: CUSTOM | Mod: JZ

## 2025-04-10 ENCOUNTER — APPOINTMENT (OUTPATIENT)
Dept: ORTHOPEDIC SURGERY | Facility: CLINIC | Age: 80
End: 2025-04-10

## 2025-04-10 DIAGNOSIS — M17.12 UNILATERAL PRIMARY OSTEOARTHRITIS, LEFT KNEE: ICD-10-CM

## 2025-04-10 DIAGNOSIS — M17.11 UNILATERAL PRIMARY OSTEOARTHRITIS, RIGHT KNEE: ICD-10-CM

## 2025-04-10 PROCEDURE — J3490M: CUSTOM | Mod: JZ

## 2025-04-10 PROCEDURE — 99214 OFFICE O/P EST MOD 30 MIN: CPT | Mod: 25

## 2025-04-10 PROCEDURE — 20610 DRAIN/INJ JOINT/BURSA W/O US: CPT | Mod: LT

## 2025-06-10 ENCOUNTER — APPOINTMENT (OUTPATIENT)
Dept: ORTHOPEDIC SURGERY | Facility: CLINIC | Age: 80
End: 2025-06-10

## 2025-06-10 PROCEDURE — 20610 DRAIN/INJ JOINT/BURSA W/O US: CPT | Mod: 50

## 2025-06-10 PROCEDURE — 99214 OFFICE O/P EST MOD 30 MIN: CPT | Mod: 25

## 2025-06-17 ENCOUNTER — APPOINTMENT (OUTPATIENT)
Dept: ORTHOPEDIC SURGERY | Facility: CLINIC | Age: 80
End: 2025-06-17

## 2025-06-17 PROCEDURE — 99213 OFFICE O/P EST LOW 20 MIN: CPT | Mod: 25

## 2025-06-17 PROCEDURE — 20610 DRAIN/INJ JOINT/BURSA W/O US: CPT | Mod: 50

## 2025-06-24 ENCOUNTER — APPOINTMENT (OUTPATIENT)
Dept: ORTHOPEDIC SURGERY | Facility: CLINIC | Age: 80
End: 2025-06-24

## 2025-06-24 PROCEDURE — 99214 OFFICE O/P EST MOD 30 MIN: CPT | Mod: 25

## 2025-06-24 PROCEDURE — 20610 DRAIN/INJ JOINT/BURSA W/O US: CPT | Mod: 50
